# Patient Record
Sex: MALE | Race: WHITE | NOT HISPANIC OR LATINO | Employment: OTHER | URBAN - METROPOLITAN AREA
[De-identification: names, ages, dates, MRNs, and addresses within clinical notes are randomized per-mention and may not be internally consistent; named-entity substitution may affect disease eponyms.]

---

## 2023-08-16 ENCOUNTER — HOSPITAL ENCOUNTER (OUTPATIENT)
Dept: DATA CONVERSION | Facility: HOSPITAL | Age: 73
Discharge: HOME | End: 2023-08-16
Payer: MEDICARE

## 2023-08-16 DIAGNOSIS — I10 ESSENTIAL (PRIMARY) HYPERTENSION: ICD-10-CM

## 2023-08-16 LAB
ANION GAP SERPL CALCULATED.3IONS-SCNC: 14 MMOL/L (ref 0–19)
BUN SERPL-MCNC: 17 MG/DL (ref 8–25)
BUN/CREAT SERPL: 17 RATIO (ref 8–21)
CALCIUM SERPL-MCNC: 9.1 MG/DL (ref 8.5–10.4)
CHLORIDE SERPL-SCNC: 106 MMOL/L (ref 97–107)
CO2 SERPL-SCNC: 24 MMOL/L (ref 24–31)
CREAT SERPL-MCNC: 1 MG/DL (ref 0.4–1.6)
GFR SERPL CREATININE-BSD FRML MDRD: 78 ML/MIN/1.73 M2
GLUCOSE SERPL-MCNC: 101 MG/DL (ref 65–99)
POTASSIUM SERPL-SCNC: 4.4 MMOL/L (ref 3.4–5.1)
SODIUM SERPL-SCNC: 143 MMOL/L (ref 133–145)

## 2023-08-25 PROBLEM — D64.9 ANEMIA: Status: ACTIVE | Noted: 2023-08-25

## 2023-08-25 PROBLEM — M54.14 THORACIC RADICULOPATHY: Status: ACTIVE | Noted: 2023-08-25

## 2023-08-25 PROBLEM — K21.9 GASTROESOPHAGEAL REFLUX DISEASE: Status: ACTIVE | Noted: 2023-08-25

## 2023-08-25 PROBLEM — J31.0 CHRONIC RHINITIS: Status: ACTIVE | Noted: 2023-08-25

## 2023-08-25 PROBLEM — E87.6 HYPOKALEMIA: Status: ACTIVE | Noted: 2023-08-25

## 2023-08-25 PROBLEM — M19.90 DEGENERATIVE ARTHRITIS: Status: ACTIVE | Noted: 2023-08-25

## 2023-08-25 PROBLEM — E78.00 PURE HYPERCHOLESTEROLEMIA: Status: ACTIVE | Noted: 2023-08-25

## 2023-08-25 PROBLEM — M79.603 PAIN IN UPPER LIMB: Status: ACTIVE | Noted: 2023-08-25

## 2023-08-25 PROBLEM — M54.16 LUMBAR RADICULOPATHY: Status: ACTIVE | Noted: 2023-08-25

## 2023-08-25 PROBLEM — K44.9 PARAESOPHAGEAL HERNIA: Status: ACTIVE | Noted: 2023-08-25

## 2023-08-25 PROBLEM — L72.0 EPIDERMAL INCLUSION CYST: Status: ACTIVE | Noted: 2023-08-25

## 2023-08-25 PROBLEM — I10 ESSENTIAL HYPERTENSION: Status: ACTIVE | Noted: 2023-08-25

## 2023-08-25 PROBLEM — R00.2 PALPITATIONS: Status: ACTIVE | Noted: 2023-08-25

## 2023-08-25 PROBLEM — R01.1 HEART MURMUR: Status: ACTIVE | Noted: 2023-08-25

## 2023-08-25 PROBLEM — T65.894A: Status: ACTIVE | Noted: 2023-08-25

## 2023-08-25 PROBLEM — M48.061 LUMBAR SPINAL STENOSIS: Status: ACTIVE | Noted: 2023-08-25

## 2023-08-25 PROBLEM — G57.60 PLANTAR NERVE LESION: Status: ACTIVE | Noted: 2023-08-25

## 2023-08-25 PROBLEM — K59.00 CONSTIPATION: Status: ACTIVE | Noted: 2023-08-25

## 2023-08-25 PROBLEM — Z77.090 HISTORY OF ASBESTOS EXPOSURE: Status: ACTIVE | Noted: 2023-08-25

## 2023-08-25 PROBLEM — R73.01 IMPAIRED FASTING GLUCOSE: Status: ACTIVE | Noted: 2023-08-25

## 2023-08-25 RX ORDER — CHLORTHALIDONE 25 MG/1
25 TABLET ORAL
COMMUNITY
Start: 2021-08-23 | End: 2024-01-11 | Stop reason: WASHOUT

## 2023-08-25 RX ORDER — ATORVASTATIN CALCIUM 10 MG/1
10 TABLET, FILM COATED ORAL NIGHTLY
COMMUNITY
Start: 2018-09-17 | End: 2023-12-13

## 2023-08-25 RX ORDER — IPRATROPIUM BROMIDE 42 UG/1
2 SPRAY, METERED NASAL 3 TIMES DAILY PRN
COMMUNITY

## 2023-08-25 RX ORDER — CALCIUM CARBONATE 200(500)MG
1 TABLET,CHEWABLE ORAL DAILY
COMMUNITY

## 2023-08-25 RX ORDER — TRIAMTERENE/HYDROCHLOROTHIAZID 37.5-25 MG
1 TABLET ORAL DAILY
COMMUNITY
Start: 2022-09-19 | End: 2024-01-11 | Stop reason: WASHOUT

## 2023-08-25 RX ORDER — AMLODIPINE BESYLATE 10 MG/1
10 TABLET ORAL EVERY MORNING
COMMUNITY
Start: 2018-09-17 | End: 2024-03-11

## 2023-08-25 RX ORDER — HYDROCORTISONE 25 MG/G
1 CREAM TOPICAL DAILY PRN
COMMUNITY
Start: 2011-09-16

## 2023-08-25 RX ORDER — POLYETHYLENE GLYCOL 3350 17 G/17G
POWDER, FOR SOLUTION ORAL DAILY PRN
COMMUNITY
Start: 2021-04-28

## 2023-08-25 RX ORDER — TRIAMCINOLONE ACETONIDE 1 MG/G
1 CREAM TOPICAL 2 TIMES WEEKLY
COMMUNITY

## 2023-08-25 RX ORDER — OMEPRAZOLE 40 MG/1
40 CAPSULE, DELAYED RELEASE ORAL DAILY
COMMUNITY
Start: 2015-10-09 | End: 2024-01-11 | Stop reason: WASHOUT

## 2023-08-25 RX ORDER — SIMETHICONE 80 MG
80 TABLET,CHEWABLE ORAL 4 TIMES DAILY PRN
COMMUNITY
End: 2024-01-11 | Stop reason: WASHOUT

## 2023-08-25 RX ORDER — AMLODIPINE BESYLATE 5 MG/1
5 TABLET ORAL EVERY MORNING
COMMUNITY
Start: 2021-07-15 | End: 2024-01-11 | Stop reason: WASHOUT

## 2023-10-18 ENCOUNTER — LAB (OUTPATIENT)
Dept: LAB | Facility: LAB | Age: 73
End: 2023-10-18
Payer: MEDICARE

## 2023-10-18 DIAGNOSIS — Z12.5 ENCOUNTER FOR SCREENING FOR MALIGNANT NEOPLASM OF PROSTATE: ICD-10-CM

## 2023-10-18 DIAGNOSIS — K59.00 CONSTIPATION, UNSPECIFIED: ICD-10-CM

## 2023-10-18 DIAGNOSIS — R73.01 IMPAIRED FASTING GLUCOSE: ICD-10-CM

## 2023-10-18 DIAGNOSIS — I10 ESSENTIAL (PRIMARY) HYPERTENSION: Primary | ICD-10-CM

## 2023-10-18 LAB
ALBUMIN SERPL-MCNC: 4.8 G/DL (ref 3.5–5)
ALP BLD-CCNC: 110 U/L (ref 35–125)
ALT SERPL-CCNC: 22 U/L (ref 5–40)
ANION GAP SERPL CALC-SCNC: 15 MMOL/L
AST SERPL-CCNC: 21 U/L (ref 5–40)
BILIRUB SERPL-MCNC: 0.7 MG/DL (ref 0.1–1.2)
BUN SERPL-MCNC: 20 MG/DL (ref 8–25)
CALCIUM SERPL-MCNC: 9.2 MG/DL (ref 8.5–10.4)
CHLORIDE SERPL-SCNC: 104 MMOL/L (ref 97–107)
CO2 SERPL-SCNC: 25 MMOL/L (ref 24–31)
CREAT SERPL-MCNC: 1 MG/DL (ref 0.4–1.6)
ERYTHROCYTE [DISTWIDTH] IN BLOOD BY AUTOMATED COUNT: 13.2 % (ref 11.5–14.5)
EST. AVERAGE GLUCOSE BLD GHB EST-MCNC: 114 MG/DL
GFR SERPL CREATININE-BSD FRML MDRD: 78 ML/MIN/1.73M*2
GLUCOSE SERPL-MCNC: 98 MG/DL (ref 65–99)
HBA1C MFR BLD: 5.6 %
HCT VFR BLD AUTO: 42.6 % (ref 41–52)
HGB BLD-MCNC: 14.2 G/DL (ref 13.5–17.5)
MCH RBC QN AUTO: 30.8 PG (ref 26–34)
MCHC RBC AUTO-ENTMCNC: 33.3 G/DL (ref 32–36)
MCV RBC AUTO: 92 FL (ref 80–100)
NRBC BLD-RTO: 0 /100 WBCS (ref 0–0)
PLATELET # BLD AUTO: 192 X10*3/UL (ref 150–450)
PMV BLD AUTO: 9.8 FL (ref 7.5–11.5)
POTASSIUM SERPL-SCNC: 4.3 MMOL/L (ref 3.4–5.1)
PROT SERPL-MCNC: 6.6 G/DL (ref 5.9–7.9)
PSA SERPL-MCNC: 0.6 NG/ML
RBC # BLD AUTO: 4.61 X10*6/UL (ref 4.5–5.9)
SODIUM SERPL-SCNC: 144 MMOL/L (ref 133–145)
WBC # BLD AUTO: 7.2 X10*3/UL (ref 4.4–11.3)

## 2023-10-18 PROCEDURE — 83036 HEMOGLOBIN GLYCOSYLATED A1C: CPT

## 2023-10-18 PROCEDURE — 36415 COLL VENOUS BLD VENIPUNCTURE: CPT

## 2023-10-18 PROCEDURE — G0103 PSA SCREENING: HCPCS

## 2023-10-18 PROCEDURE — 80053 COMPREHEN METABOLIC PANEL: CPT

## 2023-10-18 PROCEDURE — 85027 COMPLETE CBC AUTOMATED: CPT

## 2023-10-25 ENCOUNTER — APPOINTMENT (OUTPATIENT)
Dept: PRIMARY CARE | Facility: CLINIC | Age: 73
End: 2023-10-25
Payer: MEDICARE

## 2023-12-13 DIAGNOSIS — E78.00 PURE HYPERCHOLESTEROLEMIA: ICD-10-CM

## 2023-12-13 RX ORDER — ATORVASTATIN CALCIUM 10 MG/1
10 TABLET, FILM COATED ORAL NIGHTLY
Qty: 90 TABLET | Refills: 2 | Status: SHIPPED | OUTPATIENT
Start: 2023-12-13

## 2024-01-11 ENCOUNTER — OFFICE VISIT (OUTPATIENT)
Dept: PRIMARY CARE | Facility: CLINIC | Age: 74
End: 2024-01-11
Payer: MEDICARE

## 2024-01-11 VITALS
BODY MASS INDEX: 28.09 KG/M2 | WEIGHT: 193 LBS | OXYGEN SATURATION: 97 % | SYSTOLIC BLOOD PRESSURE: 150 MMHG | DIASTOLIC BLOOD PRESSURE: 80 MMHG | HEART RATE: 84 BPM

## 2024-01-11 DIAGNOSIS — I10 ESSENTIAL HYPERTENSION: ICD-10-CM

## 2024-01-11 DIAGNOSIS — Z00.00 ROUTINE GENERAL MEDICAL EXAMINATION AT HEALTH CARE FACILITY: Primary | ICD-10-CM

## 2024-01-11 DIAGNOSIS — R73.01 IMPAIRED FASTING GLUCOSE: ICD-10-CM

## 2024-01-11 DIAGNOSIS — Z77.090 HISTORY OF ASBESTOS EXPOSURE: ICD-10-CM

## 2024-01-11 DIAGNOSIS — M15.9 PRIMARY OSTEOARTHRITIS INVOLVING MULTIPLE JOINTS: ICD-10-CM

## 2024-01-11 DIAGNOSIS — M48.061 SPINAL STENOSIS OF LUMBAR REGION WITHOUT NEUROGENIC CLAUDICATION: ICD-10-CM

## 2024-01-11 DIAGNOSIS — Z12.5 SCREENING FOR PROSTATE CANCER: ICD-10-CM

## 2024-01-11 DIAGNOSIS — E78.00 PURE HYPERCHOLESTEROLEMIA: ICD-10-CM

## 2024-01-11 PROBLEM — K44.9 PARAESOPHAGEAL HERNIA: Status: RESOLVED | Noted: 2023-08-25 | Resolved: 2024-01-11

## 2024-01-11 PROCEDURE — 3077F SYST BP >= 140 MM HG: CPT | Performed by: INTERNAL MEDICINE

## 2024-01-11 PROCEDURE — 1159F MED LIST DOCD IN RCRD: CPT | Performed by: INTERNAL MEDICINE

## 2024-01-11 PROCEDURE — 3079F DIAST BP 80-89 MM HG: CPT | Performed by: INTERNAL MEDICINE

## 2024-01-11 PROCEDURE — 99215 OFFICE O/P EST HI 40 MIN: CPT | Performed by: INTERNAL MEDICINE

## 2024-01-11 PROCEDURE — 1126F AMNT PAIN NOTED NONE PRSNT: CPT | Performed by: INTERNAL MEDICINE

## 2024-01-11 PROCEDURE — G0439 PPPS, SUBSEQ VISIT: HCPCS | Performed by: INTERNAL MEDICINE

## 2024-01-11 RX ORDER — LISINOPRIL 10 MG/1
10 TABLET ORAL DAILY
Qty: 90 TABLET | Refills: 2 | Status: SHIPPED | OUTPATIENT
Start: 2024-01-11 | End: 2024-10-07

## 2024-01-11 ASSESSMENT — PAIN SCALES - GENERAL: PAINLEVEL: 0-NO PAIN

## 2024-01-11 ASSESSMENT — ENCOUNTER SYMPTOMS
OCCASIONAL FEELINGS OF UNSTEADINESS: 0
SHORTNESS OF BREATH: 0
LOSS OF SENSATION IN FEET: 0
PALPITATIONS: 0
DEPRESSION: 0

## 2024-01-11 ASSESSMENT — PATIENT HEALTH QUESTIONNAIRE - PHQ9
2. FEELING DOWN, DEPRESSED OR HOPELESS: NOT AT ALL
1. LITTLE INTEREST OR PLEASURE IN DOING THINGS: NOT AT ALL
2. FEELING DOWN, DEPRESSED OR HOPELESS: NOT AT ALL
SUM OF ALL RESPONSES TO PHQ9 QUESTIONS 1 AND 2: 0
SUM OF ALL RESPONSES TO PHQ9 QUESTIONS 1 AND 2: 0
1. LITTLE INTEREST OR PLEASURE IN DOING THINGS: NOT AT ALL

## 2024-01-11 NOTE — PROGRESS NOTES
[facility]: [facility]  MEDICARE WELLNESS EXAM      Jose Baker is a 73 y.o. male that is presenting today for Annual Exam (Pt states on the rt hand he has trigger finger. Rt ring finger, pt wants to know if he can get a chest xray due to where he used to work ).    Assessment/Plan    Diagnoses and all orders for this visit:  Routine general medical examination at health care facility  Essential hypertension  Comments:  Add Lisinopril 10 mg daily with amlodipine 10mg daily. Low salt diet, rare alcohol.  History of asbestos exposure  Comments:  Recheck chest xray 5/24.  Orders:  -     XR chest 2 views; Future  Primary osteoarthritis involving multiple joints  Spinal stenosis of lumbar region without neurogenic claudication  Pure hypercholesterolemia  Comments:  On atorvastatin recheck levels.  Orders:  -     Lipid Panel; Future  -     Comprehensive Metabolic Panel; Future  Impaired fasting glucose  -     CBC; Future  -     Hemoglobin A1C; Future  Screening for prostate cancer  -     Prostate Specific Antigen; Future    ADVANCED CARE PLANNING  Advanced Care Planning was discussed with patient:  The patient does not have an advanced care plan on file. The patient does not have an active surrogate decision-maker on file.  Encouraged the patient to confirm that Living Will and Healthcare Power of  (HCPoA) are accurate and up to date.  Encouraged the patient to confirm that our office be provided a copy of any documentation in the event that anything changes.    ACTIVITIES OF DAILY LIVING  Basic ADLs:  Bathing: Independent, Dressing: Independent, Toileting: Independent, Transferring: Independent, Continence: Independent, Feeding: Independent.    Instrumental ADLs:  Ability to use phone: Independent, Shopping: Independent, Cooking: Independent, House-keeping: Independent, Laundry: Independent, Transportation: Independent, Medication Management: Independent, Finance Management:  Independent.    Subjective   Wellness visit. Over all health status doing well. Diet reviewed, Mediterranean, low sugar diet suggested. No  active depression, or little interest in doing activites or hopelessness. Home safety reviewed, well light, no throw rugs,etc. No falls. Advanced directives filled out at home.  ADL, and instrumental ADL no limits doing well. Vision screen eye exam yearly, hearing screen 6 ft whisper test normal.  No cognitive decline observed. Screening sheet given. Back OK, bring in [employer], Living will papers on follow up.      Review of Systems   Respiratory:  Negative for shortness of breath.    Cardiovascular:  Negative for chest pain and palpitations.   All other systems reviewed and are negative.    Objective   Vitals:    01/11/24 1354   BP: 150/80   Pulse: 84   SpO2: 97%      Body mass index is 28.09 kg/m².  Physical Exam  Constitutional:       General: He is not in acute distress.     Appearance: He is not toxic-appearing.   HENT:      Head: Normocephalic and atraumatic.      Right Ear: Tympanic membrane and ear canal normal.      Left Ear: Tympanic membrane and ear canal normal.      Nose: Nose normal.      Mouth/Throat:      Pharynx: Oropharynx is clear.   Eyes:      Extraocular Movements: Extraocular movements intact.      Pupils: Pupils are equal, round, and reactive to light.   Cardiovascular:      Rate and Rhythm: Normal rate and regular rhythm.      Heart sounds: Murmur (2/6 ARUN) heard.   Pulmonary:      Breath sounds: Normal breath sounds.   Abdominal:      General: Bowel sounds are normal. There is no distension.      Palpations: Abdomen is soft. There is no mass.      Tenderness: There is no abdominal tenderness.   Musculoskeletal:         General: No swelling or tenderness.      Right lower leg: Edema (trace) present.      Left lower leg: Left lower leg edema: trace.   Skin:     General: Skin is warm and dry.   Neurological:      General: No focal deficit present.       "Mental Status: He is oriented to person, place, and time.      Sensory: No sensory deficit.      Motor: No weakness.      Deep Tendon Reflexes: Reflexes normal.       Diagnostic Results   Lab Results   Component Value Date    GLUCOSE 98 10/18/2023    CALCIUM 9.2 10/18/2023     10/18/2023    K 4.3 10/18/2023    CO2 25 10/18/2023     10/18/2023    BUN 20 10/18/2023    CREATININE 1.00 10/18/2023     Lab Results   Component Value Date    ALT 22 10/18/2023    AST 21 10/18/2023    ALKPHOS 110 10/18/2023    BILITOT 0.7 10/18/2023     Lab Results   Component Value Date    WBC 7.2 10/18/2023    HGB 14.2 10/18/2023    HCT 42.6 10/18/2023    MCV 92 10/18/2023     10/18/2023     Lab Results   Component Value Date    CHOL 140 03/08/2023    CHOL 189 04/19/2022    CHOL 148 04/22/2021     Lab Results   Component Value Date    HDL 75 03/08/2023    HDL 60 04/19/2022    HDL 53 04/22/2021     Lab Results   Component Value Date    LDLCALC 45 (L) 03/08/2023    LDLCALC 96 04/19/2022    LDLCALC 75 04/22/2021     Lab Results   Component Value Date    TRIG 99 03/08/2023    TRIG 163 (H) 04/19/2022    TRIG 98 04/22/2021     No components found for: \"CHOLHDL\"  Lab Results   Component Value Date    HGBA1C 5.6 10/18/2023     Other labs not included in the list above reviewed either before or during this encounter.    History   Past Medical History:   Diagnosis Date    Benign enlargement of prostate     10/22 PSA 0.8    Cervical radiculopathy     Chronic rhinitis     nasal steroids no help, seen ENT, atrovent nasal spray    Degenerative arthritis 08/25/2023    ED (erectile dysfunction)     Heart murmur 08/25/2023    Echo Dr. Perkins echo 10/22 EF 60% valves OK.    History of asbestos exposure     [facility]. 3/23 CXR neg.    History of SCC (squamous cell carcinoma) of skin     [facility] skin check yrly m10/22    HTN (hypertension)     ECG NSR 8/22    Impaired fasting blood sugar     Lumbar radiculopathy      " injections1=/- response, declines surgery.    Lumbar spinal stenosis 2023    Ocular migraine     carotids 50-69% B, ESR,  follow up     Paraesophageal hernia 2023    Personal history of malignant neoplasm, unspecified     History of malignant neoplasm    Personal history of other diseases of the circulatory system     History of hypertension    Pure hypercholesterolemia 2023     Past Surgical History:   Procedure Laterality Date    CYST REMOVAL      left ear sebaceous cyst removal .    OTHER SURGICAL HISTORY  2022    Skin lesion excision    PARAESOPHAGEAL HERNIA REPAIR Bilateral     Dr.Aviv Lobo  with mesh,crural repair,partial fundicoplication.     Family History   Problem Relation Name Age of Onset    Diabetes Father      Deep vein thrombosis Brother      Deep vein thrombosis Paternal Grandfather       Social History     Socioeconomic History    Marital status:      Spouse name: Not on file    Number of children: Not on file    Years of education: Not on file    Highest education level: Not on file   Occupational History    Not on file   Tobacco Use    Smoking status: Former     Types: Cigarettes     Quit date:      Years since quittin.0    Smokeless tobacco: Never   Vaping Use    Vaping Use: Never used   Substance and Sexual Activity    Alcohol use: Yes     Alcohol/week: 10.0 standard drinks of alcohol     Types: 10 Standard drinks or equivalent per week    Drug use: Never    Sexual activity: Not on file   Other Topics Concern    Not on file   Social History Narrative    Not on file     Social Determinants of Health     Financial Resource Strain: Not on file   Food Insecurity: Not on file   Transportation Needs: Not on file   Physical Activity: Not on file   Stress: Not on file   Social Connections: Not on file   Intimate Partner Violence: Not on file   Housing Stability: Not on file     Allergies   Allergen Reactions    Ipratropium Bromide Other      Dryness/nasal    Losartan Nausea Only and Other     Indigestion/ upset gi     Current Outpatient Medications on File Prior to Visit   Medication Sig Dispense Refill    amLODIPine (Norvasc) 10 mg tablet Take 1 tablet (10 mg) by mouth once daily in the morning.      atorvastatin (Lipitor) 10 mg tablet TAKE ONE TABLET BY MOUTH DAILY AT BEDTIME 90 tablet 2    calcium carbonate (Tums) 200 mg calcium chewable tablet Chew 1 tablet (500 mg) once daily.  for 30 day(s)      hydrocortisone 2.5 % cream Apply 1 Application topically once daily as needed (to affected area).      ipratropium (Atrovent) 42 mcg (0.06 %) nasal spray Administer 2 sprays into each nostril 3 times a day as needed (nasal congestion for 30 days).      multivit-minerals/folic acid (ADULT MULTIVITAMIN GUMMIES ORAL) Multivitamin Gummies Adults      polyethylene glycol (Miralax) 17 gram/dose powder Take by mouth once daily as needed (1-2 capfuls).      triamcinolone (Kenalog) 0.1 % cream Apply 1 Application topically 2 times a week.      [DISCONTINUED] amLODIPine (Norvasc) 5 mg tablet Take 1 tablet (5 mg) by mouth once daily in the morning.      [DISCONTINUED] chlorthalidone (Hygroton) 25 mg tablet Take 1 tablet (25 mg) by mouth once daily with breakfast.      [DISCONTINUED] omeprazole (PriLOSEC) 40 mg DR capsule Take 1 capsule (40 mg) by mouth once daily.      [DISCONTINUED] pedi multivit 43-iron fumarate (Flintstones Complete, iron,) 18 mg iron tablet,chewable Chew 1 tablet once daily. for 30 day(s)      [DISCONTINUED] simethicone (Mylicon) 80 mg chewable tablet Chew 1 tablet (80 mg) 4 times a day as needed (After meals and at bedtime.).      [DISCONTINUED] triamterene-hydrochlorothiazid (Maxzide-25) 37.5-25 mg tablet Take 1 tablet by mouth once daily. for 90 days       No current facility-administered medications on file prior to visit.     Immunization History   Administered Date(s) Administered    DTaP vaccine, pediatric  (INFANRIX) 03/16/2012    Flu  vaccine, quadrivalent, high-dose, preservative free, age 65y+ (FLUZONE) 10/21/2020, 10/28/2021, 10/06/2022    Hepatitis B vaccine, adult (RECOMBIVAX, ENGERIX) 06/14/2013, 07/12/2013, 12/13/2013    Influenza, High Dose Seasonal, Preservative Free 10/14/2016, 10/27/2017, 11/02/2018, 11/14/2019    Influenza, injectable, quadrivalent 10/09/2015    Influenza, seasonal, injectable 10/31/2012, 10/15/2013    Moderna SARS-CoV-2 Vaccination 03/15/2021, 04/15/2021, 12/15/2021    Pneumococcal conjugate vaccine, 13-valent (PREVNAR 13) 10/14/2016    Pneumococcal polysaccharide vaccine, 23-valent, age 2 years and older (PNEUMOVAX 23) 03/15/2013, 05/04/2018    Td (adult), unspecified 03/12/2004, 03/01/2012    Zoster vaccine, recombinant, adult (SHINGRIX) 10/06/2022, 01/11/2023    Zoster, live 01/01/2009     Patient's medical history was reviewed and updated either before or during this encounter.     Sergio Dumont MD

## 2024-01-11 NOTE — PATIENT INSTRUCTIONS
Get labs, Chest xray 3/24 as ordered.      Diagnoses and all orders for this visit:  Routine general medical examination at health care facility  Essential hypertension  Comments:  Add Lisinopril 10 mg daily with amlodipine 10mg daily. Low salt diet, rare alcohol.  Orders:  -     lisinopril 10 mg tablet; Take 1 tablet (10 mg) by mouth once daily.  History of asbestos exposure  Comments:  Recheck chest xray 5/24.  Orders:  -     XR chest 2 views; Future  Primary osteoarthritis involving multiple joints  Spinal stenosis of lumbar region without neurogenic claudication  Pure hypercholesterolemia  Comments:  On atorvastatin recheck levels.  Orders:  -     Lipid Panel; Future  -     Comprehensive Metabolic Panel; Future  Impaired fasting glucose  -     CBC; Future  -     Hemoglobin A1C; Future  Screening for prostate cancer  -     Prostate Specific Antigen; Future

## 2024-03-09 DIAGNOSIS — I10 ESSENTIAL HYPERTENSION: ICD-10-CM

## 2024-03-11 ENCOUNTER — HOSPITAL ENCOUNTER (OUTPATIENT)
Dept: RADIOLOGY | Facility: CLINIC | Age: 74
Discharge: HOME | End: 2024-03-11
Payer: MEDICARE

## 2024-03-11 ENCOUNTER — LAB (OUTPATIENT)
Dept: LAB | Facility: LAB | Age: 74
End: 2024-03-11
Payer: MEDICARE

## 2024-03-11 DIAGNOSIS — R73.01 IMPAIRED FASTING GLUCOSE: ICD-10-CM

## 2024-03-11 DIAGNOSIS — Z12.5 SCREENING FOR PROSTATE CANCER: ICD-10-CM

## 2024-03-11 DIAGNOSIS — Z77.090 HISTORY OF ASBESTOS EXPOSURE: ICD-10-CM

## 2024-03-11 DIAGNOSIS — E78.00 PURE HYPERCHOLESTEROLEMIA: ICD-10-CM

## 2024-03-11 LAB
ALBUMIN SERPL-MCNC: 4.8 G/DL (ref 3.5–5)
ALP BLD-CCNC: 96 U/L (ref 35–125)
ALT SERPL-CCNC: 18 U/L (ref 5–40)
ANION GAP SERPL CALC-SCNC: 15 MMOL/L
AST SERPL-CCNC: 16 U/L (ref 5–40)
BILIRUB SERPL-MCNC: 0.7 MG/DL (ref 0.1–1.2)
BUN SERPL-MCNC: 24 MG/DL (ref 8–25)
CALCIUM SERPL-MCNC: 9.6 MG/DL (ref 8.5–10.4)
CHLORIDE SERPL-SCNC: 101 MMOL/L (ref 97–107)
CHOLEST SERPL-MCNC: 155 MG/DL (ref 133–200)
CHOLEST/HDLC SERPL: 2 {RATIO}
CO2 SERPL-SCNC: 24 MMOL/L (ref 24–31)
CREAT SERPL-MCNC: 1.2 MG/DL (ref 0.4–1.6)
EGFRCR SERPLBLD CKD-EPI 2021: 63 ML/MIN/1.73M*2
ERYTHROCYTE [DISTWIDTH] IN BLOOD BY AUTOMATED COUNT: 12.7 % (ref 11.5–14.5)
EST. AVERAGE GLUCOSE BLD GHB EST-MCNC: 111 MG/DL
GLUCOSE SERPL-MCNC: 99 MG/DL (ref 65–99)
HBA1C MFR BLD: 5.5 %
HCT VFR BLD AUTO: 43.7 % (ref 41–52)
HDLC SERPL-MCNC: 76 MG/DL
HGB BLD-MCNC: 14.5 G/DL (ref 13.5–17.5)
LDLC SERPL CALC-MCNC: 61 MG/DL (ref 65–130)
MCH RBC QN AUTO: 30.9 PG (ref 26–34)
MCHC RBC AUTO-ENTMCNC: 33.2 G/DL (ref 32–36)
MCV RBC AUTO: 93 FL (ref 80–100)
NRBC BLD-RTO: 0 /100 WBCS (ref 0–0)
PLATELET # BLD AUTO: 202 X10*3/UL (ref 150–450)
POTASSIUM SERPL-SCNC: 4.7 MMOL/L (ref 3.4–5.1)
PROT SERPL-MCNC: 6.9 G/DL (ref 5.9–7.9)
PSA SERPL-MCNC: 0.6 NG/ML
RBC # BLD AUTO: 4.69 X10*6/UL (ref 4.5–5.9)
SODIUM SERPL-SCNC: 140 MMOL/L (ref 133–145)
TRIGL SERPL-MCNC: 88 MG/DL (ref 40–150)
WBC # BLD AUTO: 8.8 X10*3/UL (ref 4.4–11.3)

## 2024-03-11 PROCEDURE — 85027 COMPLETE CBC AUTOMATED: CPT

## 2024-03-11 PROCEDURE — 80061 LIPID PANEL: CPT

## 2024-03-11 PROCEDURE — 36415 COLL VENOUS BLD VENIPUNCTURE: CPT

## 2024-03-11 PROCEDURE — 83036 HEMOGLOBIN GLYCOSYLATED A1C: CPT

## 2024-03-11 PROCEDURE — G0103 PSA SCREENING: HCPCS

## 2024-03-11 PROCEDURE — 71046 X-RAY EXAM CHEST 2 VIEWS: CPT | Performed by: RADIOLOGY

## 2024-03-11 PROCEDURE — 80053 COMPREHEN METABOLIC PANEL: CPT

## 2024-03-11 PROCEDURE — 71046 X-RAY EXAM CHEST 2 VIEWS: CPT

## 2024-03-11 RX ORDER — AMLODIPINE BESYLATE 10 MG/1
10 TABLET ORAL
Qty: 90 TABLET | Refills: 2 | Status: SHIPPED | OUTPATIENT
Start: 2024-03-11

## 2024-04-22 ENCOUNTER — HOSPITAL ENCOUNTER (OUTPATIENT)
Dept: RADIOLOGY | Facility: CLINIC | Age: 74
Discharge: HOME | End: 2024-04-22
Payer: MEDICARE

## 2024-04-22 DIAGNOSIS — M48.061 SPINAL STENOSIS, LUMBAR REGION WITHOUT NEUROGENIC CLAUDICATION: ICD-10-CM

## 2024-04-22 PROCEDURE — 72148 MRI LUMBAR SPINE W/O DYE: CPT

## 2024-04-22 PROCEDURE — 72148 MRI LUMBAR SPINE W/O DYE: CPT | Performed by: RADIOLOGY

## 2024-07-08 PROBLEM — R21 RASH AND OTHER NONSPECIFIC SKIN ERUPTION: Status: RESOLVED | Noted: 2024-07-08 | Resolved: 2024-07-08

## 2024-07-08 PROBLEM — M17.12 PRIMARY OSTEOARTHRITIS OF LEFT KNEE: Status: RESOLVED | Noted: 2017-01-20 | Resolved: 2024-07-08

## 2024-07-08 PROBLEM — S29.012A STRAIN OF THORACIC BACK REGION: Status: RESOLVED | Noted: 2018-10-05 | Resolved: 2024-07-08

## 2024-07-08 PROBLEM — M47.816 LUMBAR SPONDYLOSIS: Status: RESOLVED | Noted: 2024-07-08 | Resolved: 2024-07-08

## 2024-07-08 PROBLEM — E87.6 HYPOKALEMIA: Status: RESOLVED | Noted: 2023-05-28 | Resolved: 2024-07-08

## 2024-07-08 PROBLEM — H25.813 COMBINED FORMS OF AGE-RELATED CATARACT OF BOTH EYES: Status: RESOLVED | Noted: 2024-07-08 | Resolved: 2024-07-08

## 2024-07-08 PROBLEM — R26.9 ABNORMALITY OF GAIT: Status: RESOLVED | Noted: 2017-08-09 | Resolved: 2024-07-08

## 2024-07-08 PROBLEM — H52.7 DISORDER OF REFRACTION: Status: RESOLVED | Noted: 2024-07-08 | Resolved: 2024-07-08

## 2024-07-08 PROBLEM — Z86.79 HISTORY OF HYPERTENSION: Status: RESOLVED | Noted: 2024-07-08 | Resolved: 2024-07-08

## 2024-07-08 PROBLEM — G89.29 CHRONIC PAIN OF LEFT KNEE: Status: RESOLVED | Noted: 2017-08-09 | Resolved: 2024-07-08

## 2024-07-08 PROBLEM — Z20.822 CONTACT WITH AND (SUSPECTED) EXPOSURE TO COVID-19: Status: RESOLVED | Noted: 2022-08-22 | Resolved: 2024-07-08

## 2024-07-08 PROBLEM — D64.9 ANEMIA: Status: RESOLVED | Noted: 2022-08-22 | Resolved: 2024-07-08

## 2024-07-08 PROBLEM — L72.0 EPIDERMOID CYST: Status: RESOLVED | Noted: 2024-07-08 | Resolved: 2024-07-08

## 2024-07-08 PROBLEM — G43.109 MIGRAINE WITH AURA: Status: RESOLVED | Noted: 2024-07-08 | Resolved: 2024-07-08

## 2024-07-08 PROBLEM — M48.061 SPINAL STENOSIS OF LUMBAR REGION: Status: RESOLVED | Noted: 2024-07-08 | Resolved: 2024-07-08

## 2024-07-08 PROBLEM — Z85.9 HISTORY OF MALIGNANT NEOPLASM: Status: RESOLVED | Noted: 2024-07-08 | Resolved: 2024-07-08

## 2024-07-08 PROBLEM — M19.90 OSTEOARTHRITIS: Status: RESOLVED | Noted: 2023-05-28 | Resolved: 2024-07-08

## 2024-07-08 PROBLEM — M25.562 CHRONIC PAIN OF LEFT KNEE: Status: RESOLVED | Noted: 2017-08-09 | Resolved: 2024-07-08

## 2024-07-08 PROBLEM — Z11.52 ENCOUNTER FOR SCREENING FOR COVID-19: Status: RESOLVED | Noted: 2022-08-19 | Resolved: 2024-07-08

## 2024-07-08 PROBLEM — M17.10 ARTHRITIS OF KNEE: Status: RESOLVED | Noted: 2017-01-20 | Resolved: 2024-07-08

## 2024-07-11 ENCOUNTER — OFFICE VISIT (OUTPATIENT)
Dept: PRIMARY CARE | Facility: CLINIC | Age: 74
End: 2024-07-11
Payer: MEDICARE

## 2024-07-11 VITALS
HEART RATE: 89 BPM | DIASTOLIC BLOOD PRESSURE: 84 MMHG | HEIGHT: 70 IN | TEMPERATURE: 98.1 F | OXYGEN SATURATION: 95 % | BODY MASS INDEX: 27.2 KG/M2 | SYSTOLIC BLOOD PRESSURE: 126 MMHG | WEIGHT: 190 LBS

## 2024-07-11 DIAGNOSIS — I10 ESSENTIAL HYPERTENSION: Primary | ICD-10-CM

## 2024-07-11 DIAGNOSIS — I10 ESSENTIAL HYPERTENSION: ICD-10-CM

## 2024-07-11 DIAGNOSIS — K21.9 GASTROESOPHAGEAL REFLUX DISEASE WITHOUT ESOPHAGITIS: ICD-10-CM

## 2024-07-11 DIAGNOSIS — E78.00 PURE HYPERCHOLESTEROLEMIA: ICD-10-CM

## 2024-07-11 DIAGNOSIS — N40.0 BENIGN PROSTATIC HYPERPLASIA WITHOUT LOWER URINARY TRACT SYMPTOMS: ICD-10-CM

## 2024-07-11 DIAGNOSIS — R73.01 IMPAIRED FASTING GLUCOSE: ICD-10-CM

## 2024-07-11 DIAGNOSIS — J31.0 CHRONIC RHINITIS: ICD-10-CM

## 2024-07-11 DIAGNOSIS — J30.0 VASOMOTOR RHINITIS: ICD-10-CM

## 2024-07-11 PROCEDURE — 1159F MED LIST DOCD IN RCRD: CPT | Performed by: INTERNAL MEDICINE

## 2024-07-11 PROCEDURE — 99214 OFFICE O/P EST MOD 30 MIN: CPT | Performed by: INTERNAL MEDICINE

## 2024-07-11 PROCEDURE — G2211 COMPLEX E/M VISIT ADD ON: HCPCS | Performed by: INTERNAL MEDICINE

## 2024-07-11 PROCEDURE — 1125F AMNT PAIN NOTED PAIN PRSNT: CPT | Performed by: INTERNAL MEDICINE

## 2024-07-11 PROCEDURE — 3079F DIAST BP 80-89 MM HG: CPT | Performed by: INTERNAL MEDICINE

## 2024-07-11 PROCEDURE — 1036F TOBACCO NON-USER: CPT | Performed by: INTERNAL MEDICINE

## 2024-07-11 PROCEDURE — 3074F SYST BP LT 130 MM HG: CPT | Performed by: INTERNAL MEDICINE

## 2024-07-11 RX ORDER — IPRATROPIUM BROMIDE 42 UG/1
2 SPRAY, METERED NASAL 3 TIMES DAILY PRN
Qty: 15 ML | Refills: 11 | Status: SHIPPED | OUTPATIENT
Start: 2024-07-11

## 2024-07-11 RX ORDER — LISINOPRIL 10 MG/1
10 TABLET ORAL DAILY
Qty: 90 TABLET | Refills: 2 | Status: SHIPPED | OUTPATIENT
Start: 2024-07-11 | End: 2025-04-07

## 2024-07-11 RX ORDER — ATORVASTATIN CALCIUM 10 MG/1
10 TABLET, FILM COATED ORAL NIGHTLY
Qty: 90 TABLET | Refills: 2 | Status: SHIPPED | OUTPATIENT
Start: 2024-07-11

## 2024-07-11 RX ORDER — AMLODIPINE BESYLATE 10 MG/1
10 TABLET ORAL
Qty: 90 TABLET | Refills: 2 | Status: SHIPPED | OUTPATIENT
Start: 2024-07-11

## 2024-07-11 ASSESSMENT — ENCOUNTER SYMPTOMS
DEPRESSION: 0
SHORTNESS OF BREATH: 0
OCCASIONAL FEELINGS OF UNSTEADINESS: 0
PALPITATIONS: 0
LOSS OF SENSATION IN FEET: 0

## 2024-07-11 ASSESSMENT — PATIENT HEALTH QUESTIONNAIRE - PHQ9
2. FEELING DOWN, DEPRESSED OR HOPELESS: NOT AT ALL
1. LITTLE INTEREST OR PLEASURE IN DOING THINGS: NOT AT ALL
SUM OF ALL RESPONSES TO PHQ9 QUESTIONS 1 AND 2: 0

## 2024-07-11 ASSESSMENT — PAIN SCALES - GENERAL: PAINLEVEL: 6

## 2024-07-11 NOTE — PATIENT INSTRUCTIONS
January follow up. Labs 1-2 weeks before apt. RSV, flu vaccine at pharmacies 9-10/24.    Diagnoses and all orders for this visit:  Essential hypertension  Comments:  BP doing OK.  Orders:  -     lisinopril 10 mg tablet; Take 1 tablet (10 mg) by mouth once daily.  -     amLODIPine (Norvasc) 10 mg tablet; Take 1 tablet (10 mg) by mouth once daily in the morning. Take before meals.  Impaired fasting glucose  -     CBC and Auto Differential; Future  -     Hemoglobin A1C; Future  Chronic rhinitis  Gastroesophageal reflux disease without esophagitis  Pure hypercholesterolemia  Comments:  LDL 61 on atorvastatin 3/24.  Orders:  -     atorvastatin (Lipitor) 10 mg tablet; Take 1 tablet (10 mg) by mouth once daily at bedtime.  -     Comprehensive Metabolic Panel; Future  -     Lipid Panel; Future  Essential hypertension  Comments:  Add Lisinopril 10 mg daily with amlodipine 10mg daily. Low salt diet, rare alcohol.  Orders:  -     lisinopril 10 mg tablet; Take 1 tablet (10 mg) by mouth once daily.  -     amLODIPine (Norvasc) 10 mg tablet; Take 1 tablet (10 mg) by mouth once daily in the morning. Take before meals.  Pure hypercholesterolemia  -     atorvastatin (Lipitor) 10 mg tablet; Take 1 tablet (10 mg) by mouth once daily at bedtime.  -     Comprehensive Metabolic Panel; Future  -     Lipid Panel; Future  Essential hypertension  -     lisinopril 10 mg tablet; Take 1 tablet (10 mg) by mouth once daily.  -     amLODIPine (Norvasc) 10 mg tablet; Take 1 tablet (10 mg) by mouth once daily in the morning. Take before meals.  Vasomotor rhinitis  -     ipratropium (Atrovent) 42 mcg (0.06 %) nasal spray; Administer 2 sprays into each nostril 3 times a day as needed (nasal congestion for 30 days).  Benign prostatic hyperplasia without lower urinary tract symptoms  -     PSA; Future  Other orders  -     Follow Up In Primary Care - Established

## 2024-07-11 NOTE — PROGRESS NOTES
[facility]: [facility]  PROGRESS NOTE      Jose Baker is a 73 y.o. male that is presenting today for Follow-up.    Assessment/Plan   Diagnoses and all orders for this visit:  Essential hypertension  Comments:  BP doing OK.  Orders:  -     lisinopril 10 mg tablet; Take 1 tablet (10 mg) by mouth once daily.  -     amLODIPine (Norvasc) 10 mg tablet; Take 1 tablet (10 mg) by mouth once daily in the morning. Take before meals.  Impaired fasting glucose  -     CBC and Auto Differential; Future  -     Hemoglobin A1C; Future  Chronic rhinitis  Gastroesophageal reflux disease without esophagitis  Pure hypercholesterolemia  Comments:  LDL 61 on atorvastatin 3/24.  Orders:  -     atorvastatin (Lipitor) 10 mg tablet; Take 1 tablet (10 mg) by mouth once daily at bedtime.  -     Comprehensive Metabolic Panel; Future  -     Lipid Panel; Future  Essential hypertension  Comments:  Add Lisinopril 10 mg daily with amlodipine 10mg daily. Low salt diet, rare alcohol.  Orders:  -     lisinopril 10 mg tablet; Take 1 tablet (10 mg) by mouth once daily.  -     amLODIPine (Norvasc) 10 mg tablet; Take 1 tablet (10 mg) by mouth once daily in the morning. Take before meals.  Pure hypercholesterolemia  -     atorvastatin (Lipitor) 10 mg tablet; Take 1 tablet (10 mg) by mouth once daily at bedtime.  -     Comprehensive Metabolic Panel; Future  -     Lipid Panel; Future  Essential hypertension  -     lisinopril 10 mg tablet; Take 1 tablet (10 mg) by mouth once daily.  -     amLODIPine (Norvasc) 10 mg tablet; Take 1 tablet (10 mg) by mouth once daily in the morning. Take before meals.  Vasomotor rhinitis  -     ipratropium (Atrovent) 42 mcg (0.06 %) nasal spray; Administer 2 sprays into each nostril 3 times a day as needed (nasal congestion for 30 days).  Benign prostatic hyperplasia without lower urinary tract symptoms  -     PSA; Future  Other orders  -     Follow Up In Primary Care - Established    Subjective   SS/LR  , PSA 0.60 3/24 ? Distended bladder no worries.  HTN, chol, IFBS. Doing OK.    Review of Systems   Respiratory:  Negative for shortness of breath.    Cardiovascular:  Negative for chest pain and palpitations.   All other systems reviewed and are negative.     Objective   Vitals:    07/11/24 1440   BP: 126/84   Pulse: 89   Temp: 36.7 °C (98.1 °F)   SpO2: 95%      Body mass index is 27.66 kg/m².  Physical Exam  Constitutional:       General: He is not in acute distress.     Appearance: He is obese.   HENT:      Head: Normocephalic and atraumatic.      Right Ear: Tympanic membrane normal.      Left Ear: Tympanic membrane normal.      Mouth/Throat:      Mouth: Mucous membranes are moist.      Pharynx: Oropharynx is clear.   Eyes:      Extraocular Movements: Extraocular movements intact.      Conjunctiva/sclera: Conjunctivae normal.      Pupils: Pupils are equal, round, and reactive to light.   Cardiovascular:      Rate and Rhythm: Normal rate and regular rhythm.      Heart sounds: Murmur (1/6 ARUN) heard.   Pulmonary:      Breath sounds: Normal breath sounds.   Abdominal:      General: Bowel sounds are normal.      Palpations: Abdomen is soft. There is no mass.   Musculoskeletal:         General: Normal range of motion.      Cervical back: Neck supple. No tenderness.   Skin:     General: Skin is warm and dry.   Neurological:      General: No focal deficit present.      Mental Status: He is oriented to person, place, and time.     Diagnostic Results   Lab Results   Component Value Date    GLUCOSE 99 03/11/2024    CALCIUM 9.6 03/11/2024     03/11/2024    K 4.7 03/11/2024    CO2 24 03/11/2024     03/11/2024    BUN 24 03/11/2024    CREATININE 1.20 03/11/2024     Lab Results   Component Value Date    ALT 18 03/11/2024    AST 16 03/11/2024    ALKPHOS 96 03/11/2024    BILITOT 0.7 03/11/2024     Lab Results   Component Value Date    WBC 8.8 03/11/2024    HGB 14.5 03/11/2024    HCT 43.7 03/11/2024    MCV 93  "03/11/2024     03/11/2024     Lab Results   Component Value Date    CHOL 155 03/11/2024    CHOL 140 03/08/2023    CHOL 189 04/19/2022     Lab Results   Component Value Date    HDL 76.0 03/11/2024    HDL 75 03/08/2023    HDL 60 04/19/2022     Lab Results   Component Value Date    LDLCALC 61 (L) 03/11/2024    LDLCALC 45 (L) 03/08/2023    LDLCALC 96 04/19/2022     Lab Results   Component Value Date    TRIG 88 03/11/2024    TRIG 99 03/08/2023    TRIG 163 (H) 04/19/2022     No components found for: \"CHOLHDL\"  Lab Results   Component Value Date    HGBA1C 5.5 03/11/2024     Other labs not included in the list above were reviewed either before or during this encounter.    History    Past Medical History:   Diagnosis Date   • Abnormality of gait 08/09/2017   • Anemia 08/22/2022   • Arthritis of knee 01/20/2017   • Benign enlargement of prostate     10/22 PSA 0.8, 3/24 0.60   • Cervical radiculopathy    • Chronic pain of left knee 08/09/2017   • Chronic rhinitis     nasal steroids no help, seen ENT, atrovent nasal spray   • Combined forms of age-related cataract of both eyes 07/08/2024   • Contact with and (suspected) exposure to covid-19 08/22/2022   • Degenerative arthritis 08/25/2023   • Disorder of refraction 07/08/2024   • ED (erectile dysfunction)    • Encounter for screening for COVID-19 08/19/2022   • Epidermoid cyst 07/08/2024   • Heart murmur 08/25/2023    Echo Dr. Perkins echo 10/22 EF 60% valves OK.   • History of asbestos exposure     [facility]. 3/23 CXR neg.   • History of hypertension 07/08/2024   • History of malignant neoplasm 07/08/2024   • History of SCC (squamous cell carcinoma) of skin     [facility] skin check yrly m10/22   • HTN (hypertension)     ECG NSR 8/22   • Hypokalemia 05/28/2023   • Impaired fasting blood sugar    • Lumbar radiculopathy      injections1=/- response, declines surgery.   • Lumbar spinal stenosis 08/25/2023   • Lumbar spondylosis 07/08/2024   • Migraine with aura " 2024   • Ocular migraine     carotids 50-69% B, ESR,  follow up    • Osteoarthritis 2023   • Paraesophageal hernia 2023   • Personal history of malignant neoplasm, unspecified     History of malignant neoplasm   • Personal history of other diseases of the circulatory system     History of hypertension   • Primary osteoarthritis of left knee 2017   • Pure hypercholesterolemia 2023   • Rash and other nonspecific skin eruption 2024   • Spinal stenosis of lumbar region 2024   • Strain of thoracic back region 10/05/2018     Past Surgical History:   Procedure Laterality Date   • CYST REMOVAL      left ear sebaceous cyst removal .   • OTHER SURGICAL HISTORY  2022    Skin lesion excision   • PARAESOPHAGEAL HERNIA REPAIR Bilateral     Dr.Aviv Lobo  with mesh,crural repair,partial fundicoplication.     Family History   Problem Relation Name Age of Onset   • Diabetes Father     • Deep vein thrombosis Brother     • Deep vein thrombosis Paternal Grandfather       Social History     Socioeconomic History   • Marital status:      Spouse name: Not on file   • Number of children: Not on file   • Years of education: Not on file   • Highest education level: Not on file   Occupational History   • Not on file   Tobacco Use   • Smoking status: Former     Current packs/day: 0.00     Types: Cigarettes     Quit date:      Years since quittin.5   • Smokeless tobacco: Never   Vaping Use   • Vaping status: Never Used   Substance and Sexual Activity   • Alcohol use: Yes     Alcohol/week: 10.0 standard drinks of alcohol     Types: 10 Standard drinks or equivalent per week   • Drug use: Never   • Sexual activity: Not on file   Other Topics Concern   • Not on file   Social History Narrative   • Not on file     Social Determinants of Health     Financial Resource Strain: Not on file   Food Insecurity: Not on file   Transportation Needs: Not on file    Physical Activity: Not on file   Stress: Not on file   Social Connections: Not on file   Intimate Partner Violence: Not on file   Housing Stability: Not on file     Allergies   Allergen Reactions   • Ipratropium Bromide Other     Dryness/nasal   • Losartan Nausea Only and Other     Indigestion/ upset gi     Current Outpatient Medications on File Prior to Visit   Medication Sig Dispense Refill   • calcium carbonate (Tums) 200 mg calcium chewable tablet Chew 1 tablet (500 mg) once daily.  for 30 day(s)     • hydrocortisone 2.5 % cream Apply 1 Application topically once daily as needed (to affected area).     • multivit-minerals/folic acid (ADULT MULTIVITAMIN GUMMIES ORAL) Multivitamin Gummies Adults     • polyethylene glycol (Miralax) 17 gram/dose powder Take by mouth once daily as needed (1-2 capfuls).     • [DISCONTINUED] amLODIPine (Norvasc) 10 mg tablet TAKE ONE TABLET BY MOUTH EVERY MORNING 90 tablet 2   • [DISCONTINUED] atorvastatin (Lipitor) 10 mg tablet TAKE ONE TABLET BY MOUTH DAILY AT BEDTIME 90 tablet 2   • [DISCONTINUED] ipratropium (Atrovent) 42 mcg (0.06 %) nasal spray Administer 2 sprays into each nostril 3 times a day as needed (nasal congestion for 30 days).     • [DISCONTINUED] lisinopril 10 mg tablet Take 1 tablet (10 mg) by mouth once daily. 90 tablet 2   • [DISCONTINUED] triamcinolone (Kenalog) 0.1 % cream Apply 1 Application topically 2 times a week.       No current facility-administered medications on file prior to visit.     Immunization History   Administered Date(s) Administered   • DTaP vaccine, pediatric  (INFANRIX) 03/16/2012   • Flu vaccine, quadrivalent, high-dose, preservative free, age 65y+ (FLUZONE) 10/21/2020, 10/28/2021, 10/06/2022   • Hepatitis B vaccine, adult *Check Product/Dose* 06/14/2013, 07/12/2013, 12/13/2013   • Influenza, High Dose Seasonal, Preservative Free 10/14/2016, 10/27/2017, 11/02/2018, 11/14/2019   • Influenza, injectable, quadrivalent 10/09/2015   • Influenza,  seasonal, injectable 10/31/2012, 10/15/2013   • Moderna SARS-CoV-2 Vaccination 03/04/2021, 03/15/2021, 04/01/2021, 04/15/2021, 12/15/2021   • Pneumococcal conjugate vaccine, 13-valent (PREVNAR 13) 10/14/2016   • Pneumococcal polysaccharide vaccine, 23-valent, age 2 years and older (PNEUMOVAX 23) 03/15/2013, 05/04/2018   • Td (adult), unspecified 03/12/2004, 03/01/2012   • Zoster vaccine, recombinant, adult (SHINGRIX) 10/06/2022, 01/11/2023   • Zoster, live 01/01/2009     Patient's medical history was reviewed and updated either before or during this encounter.       Sergio Dumont MD

## 2024-07-30 ENCOUNTER — APPOINTMENT (OUTPATIENT)
Dept: OPHTHALMOLOGY | Facility: CLINIC | Age: 74
End: 2024-07-30
Payer: MEDICARE

## 2024-07-30 ENCOUNTER — OFFICE VISIT (OUTPATIENT)
Dept: OPHTHALMOLOGY | Facility: CLINIC | Age: 74
End: 2024-07-30
Payer: MEDICARE

## 2024-07-30 DIAGNOSIS — H35.033 HYPERTENSIVE RETINOPATHY OF BOTH EYES: Primary | ICD-10-CM

## 2024-07-30 DIAGNOSIS — H25.813 COMBINED FORMS OF AGE-RELATED CATARACT OF BOTH EYES: ICD-10-CM

## 2024-07-30 DIAGNOSIS — H52.7 DISORDER OF REFRACTION: ICD-10-CM

## 2024-07-30 PROCEDURE — 92015 DETERMINE REFRACTIVE STATE: CPT | Performed by: OPHTHALMOLOGY

## 2024-07-30 PROCEDURE — 99214 OFFICE O/P EST MOD 30 MIN: CPT | Performed by: OPHTHALMOLOGY

## 2024-07-30 ASSESSMENT — CUP TO DISC RATIO
OS_RATIO: 0.3
OD_RATIO: 0.35

## 2024-07-30 ASSESSMENT — TONOMETRY
IOP_METHOD: GOLDMANN APPLANATION
OS_IOP_MMHG: 17
OD_IOP_MMHG: 17

## 2024-07-30 ASSESSMENT — REFRACTION_WEARINGRX
OS_AXIS: 074
OD_ADD: +3.00
OS_ADD: +3.00
OD_SPHERE: +2.50
SPECS_TYPE: PAL
OS_SPHERE: +2.75
OD_AXIS: 094
OD_CYLINDER: -1.00
OS_CYLINDER: -0.75

## 2024-07-30 ASSESSMENT — ENCOUNTER SYMPTOMS
CONSTITUTIONAL NEGATIVE: 0
HEMATOLOGIC/LYMPHATIC NEGATIVE: 0
RESPIRATORY NEGATIVE: 0
GASTROINTESTINAL NEGATIVE: 0
CARDIOVASCULAR NEGATIVE: 0
MUSCULOSKELETAL NEGATIVE: 1
NEUROLOGICAL NEGATIVE: 0
ENDOCRINE NEGATIVE: 0
EYES NEGATIVE: 0
ALLERGIC/IMMUNOLOGIC NEGATIVE: 0
PSYCHIATRIC NEGATIVE: 0

## 2024-07-30 ASSESSMENT — REFRACTION_MANIFEST
OD_SPHERE: +3.00
METHOD_AUTOREFRACTION: 1
OD_CYLINDER: -1.25
OS_CYLINDER: -0.75
OD_AXIS: 085
OS_SPHERE: +2.75
OS_AXIS: 075

## 2024-07-30 ASSESSMENT — SLIT LAMP EXAM - LIDS
COMMENTS: NORMAL
COMMENTS: NORMAL

## 2024-07-30 ASSESSMENT — EXTERNAL EXAM - RIGHT EYE: OD_EXAM: NORMAL

## 2024-07-30 ASSESSMENT — KERATOMETRY
OS_AXISANGLE2_DEGREES: 90
OS_K1POWER_DIOPTERS: 45.00
OD_AXISANGLE_DEGREES: 180
OD_AXISANGLE2_DEGREES: 90
OS_AXISANGLE_DEGREES: 180
METHOD_AUTO_MANUAL: AUTOMATED
OD_K2POWER_DIOPTERS: 45.75
OS_K2POWER_DIOPTERS: 46.00
OD_K1POWER_DIOPTERS: 45.00

## 2024-07-30 ASSESSMENT — PAIN SCALES - GENERAL: PAINLEVEL: 0-NO PAIN

## 2024-07-30 ASSESSMENT — VISUAL ACUITY
OS_CC+: +1
CORRECTION_TYPE: GLASSES
OS_CC: 20/25
METHOD: SNELLEN - SINGLE
OD_CC: 20/25

## 2024-07-30 ASSESSMENT — PATIENT HEALTH QUESTIONNAIRE - PHQ9
SUM OF ALL RESPONSES TO PHQ9 QUESTIONS 1 AND 2: 0
1. LITTLE INTEREST OR PLEASURE IN DOING THINGS: NOT AT ALL
2. FEELING DOWN, DEPRESSED OR HOPELESS: NOT AT ALL

## 2024-07-30 ASSESSMENT — EXTERNAL EXAM - LEFT EYE: OS_EXAM: NORMAL

## 2024-07-30 NOTE — PATIENT INSTRUCTIONS
Thank you so much for choosing me to provide your care today!    If you were dilated your vision may remain blurry   or light sensitive for several hours.    The nature of eye and vision problems can require frequent follow up, please make every effort to adhere to any future appointments.    If you have any issues, questions, or concerns,   please do not hesitate to reach out.    If you receive a survey in regards to your care today, please mention any exceptional care my office staff and/or technicians provided.    You can reach our office at this number:  [phone]

## 2024-07-30 NOTE — ASSESSMENT & PLAN NOTE
Small amount heme both eyes (OU) around ONH, suspect HTN related. Advised on continuing best HTN control and should call if any significant change in vision noted both eyes (OU).

## 2024-07-30 NOTE — ASSESSMENT & PLAN NOTE
Borderline significant by appearance but limited symptoms. Advised will continue to monitor with serial exam.

## 2024-07-30 NOTE — PROGRESS NOTES
Assessment/Plan   Problem List Items Addressed This Visit       Combined forms of age-related cataract of both eyes     Borderline significant by appearance but limited symptoms. Advised will continue to monitor with serial exam.          Disorder of refraction     Discussed glasses prescription from refraction. Will provide if patient interested in keeping for records or to fill as a new set of glasses.            Hypertensive retinopathy of both eyes - Primary     Small amount heme both eyes (OU) around ONH, suspect HTN related. Advised on continuing best HTN control and should call if any significant change in vision noted both eyes (OU).             Provided reassurance regarding above diagnoses and care received in the office visit today. Discussed outcomes and options along with the importance of treatment compliance. Understands the importance of any follow up visits. Patient instructed to call/communicate with our office if any new issues, questions, or concerns.     Will plan to see back in 1 year full or sooner PRN

## 2024-08-07 NOTE — PROGRESS NOTES
Subjective   Patient ID: Jose Baker is a 74 y.o. male who presents for No chief complaint on file..  HPI  2 yr peh/ toupet  HT: 69.5  IN  Review of Systems  CONSTITUTIONAL:          Chills No.  Fatigue No.  Fever No.   ADMITS TO JOINT PAIN      CARDIOLOGY:          Negative for dizziness, chest pain, palpitations, shortness of breath.    admits to htn     RESPIRATORY:          Sleep Apnea No.ADMITS TO INSOMNIA  Negative for chest congestion, cough, wheezing.         GASTROENTEROLOGY:          Food Intolerance No.  Abdominal pain No.  Acid reflux No.  Black stools No.  Constipation No.  Diarrhea No.  Loss of appetite no.  Nausea yes  Vomiting No.         UROLOGY:          Negative for dysuria, urinary urgency, kidney stones.         PSYCHOLOGY:          Negative for depression, anxiety, high stress level.    Objective   Physical Exam    Assessment/Plan            Nicci Shea LPN 08/07/24 2:16 PM

## 2024-08-15 ENCOUNTER — OFFICE VISIT (OUTPATIENT)
Dept: SURGERY | Facility: CLINIC | Age: 74
End: 2024-08-15
Payer: MEDICARE

## 2024-08-15 VITALS
HEART RATE: 85 BPM | SYSTOLIC BLOOD PRESSURE: 139 MMHG | BODY MASS INDEX: 26.48 KG/M2 | WEIGHT: 185 LBS | HEIGHT: 70 IN | DIASTOLIC BLOOD PRESSURE: 66 MMHG

## 2024-08-15 DIAGNOSIS — Z87.19 S/P REPAIR OF PARAESOPHAGEAL HERNIA: Primary | ICD-10-CM

## 2024-08-15 DIAGNOSIS — Z98.890 S/P REPAIR OF PARAESOPHAGEAL HERNIA: Primary | ICD-10-CM

## 2024-08-15 PROCEDURE — 1159F MED LIST DOCD IN RCRD: CPT | Performed by: SURGERY

## 2024-08-15 PROCEDURE — 99213 OFFICE O/P EST LOW 20 MIN: CPT | Performed by: SURGERY

## 2024-08-15 PROCEDURE — 1125F AMNT PAIN NOTED PAIN PRSNT: CPT | Performed by: SURGERY

## 2024-08-15 PROCEDURE — 3078F DIAST BP <80 MM HG: CPT | Performed by: SURGERY

## 2024-08-15 PROCEDURE — 3075F SYST BP GE 130 - 139MM HG: CPT | Performed by: SURGERY

## 2024-08-15 ASSESSMENT — LIFESTYLE VARIABLES
HOW OFTEN DO YOU HAVE A DRINK CONTAINING ALCOHOL: 2-4 TIMES A MONTH
SKIP TO QUESTIONS 9-10: 0
HOW OFTEN DO YOU HAVE SIX OR MORE DRINKS ON ONE OCCASION: LESS THAN MONTHLY
HOW MANY STANDARD DRINKS CONTAINING ALCOHOL DO YOU HAVE ON A TYPICAL DAY: 1 OR 2
AUDIT-C TOTAL SCORE: 3

## 2024-08-15 ASSESSMENT — PAIN SCALES - GENERAL: PAINLEVEL: 6

## 2024-08-15 ASSESSMENT — COLUMBIA-SUICIDE SEVERITY RATING SCALE - C-SSRS
1. IN THE PAST MONTH, HAVE YOU WISHED YOU WERE DEAD OR WISHED YOU COULD GO TO SLEEP AND NOT WAKE UP?: NO
6. HAVE YOU EVER DONE ANYTHING, STARTED TO DO ANYTHING, OR PREPARED TO DO ANYTHING TO END YOUR LIFE?: NO
2. HAVE YOU ACTUALLY HAD ANY THOUGHTS OF KILLING YOURSELF?: NO

## 2024-08-15 NOTE — H&P
History Of Present Illness  Jose Baker is a 74 y.o. man here for 2 year follow up from paraesophageal hernia repair.  He denies reflux.  He denies dysphagia.  He has no complaints..     Past Medical History  He has a past medical history of Abnormality of gait (08/09/2017), Anemia (08/22/2022), Arthritis of knee (01/20/2017), Benign enlargement of prostate, Cervical radiculopathy, Chronic pain of left knee (08/09/2017), Chronic rhinitis, Combined forms of age-related cataract of both eyes (07/08/2024), Contact with and (suspected) exposure to covid-19 (08/22/2022), Degenerative arthritis (08/25/2023), Disorder of refraction (07/08/2024), ED (erectile dysfunction), Encounter for screening for COVID-19 (08/19/2022), Epidermoid cyst (07/08/2024), Heart murmur (08/25/2023), History of asbestos exposure, History of hypertension (07/08/2024), History of malignant neoplasm (07/08/2024), History of SCC (squamous cell carcinoma) of skin, HTN (hypertension), Hypokalemia (05/28/2023), Impaired fasting blood sugar, Lumbar radiculopathy, Lumbar spinal stenosis (08/25/2023), Lumbar spondylosis (07/08/2024), Migraine with aura (07/08/2024), Ocular migraine, Osteoarthritis (05/28/2023), Paraesophageal hernia (08/25/2023), Personal history of malignant neoplasm, unspecified, Personal history of other diseases of the circulatory system, Primary osteoarthritis of left knee (01/20/2017), Pure hypercholesterolemia (08/25/2023), Rash and other nonspecific skin eruption (07/08/2024), Spinal stenosis of lumbar region (07/08/2024), and Strain of thoracic back region (10/05/2018).    Surgical History  He has a past surgical history that includes Other surgical history (02/18/2022); Paraesophageal hernia repair (08/22/2022); and Cyst Removal.     Social History  He reports that he quit smoking about 32 years ago. His smoking use included cigarettes. He has never used smokeless tobacco. He reports current alcohol use of about 10.0 standard  "drinks of alcohol per week. He reports that he does not use drugs.    Family History  Family History   Problem Relation Name Age of Onset    Diabetes Father      Deep vein thrombosis Brother      Deep vein thrombosis Paternal Grandfather          Allergies  Ipratropium bromide and Losartan    Review of Systems   CONSTITUTIONAL:          Chills No.  Fatigue No.  Fever No.   ADMITS TO JOINT PAIN      CARDIOLOGY:          Negative for dizziness, chest pain, palpitations, shortness of breath.    admits to htn     RESPIRATORY:          Sleep Apnea No.ADMITS TO INSOMNIA  Negative for chest congestion, cough, wheezing.         GASTROENTEROLOGY:          Food Intolerance No.  Abdominal pain No.  Acid reflux No.  Black stools No.  Constipation No.  Diarrhea No.  Loss of appetite no.  Nausea yes  Vomiting No.         UROLOGY:          Negative for dysuria, urinary urgency, kidney stones.         PSYCHOLOGY:          Negative for depression, anxiety, high stress level.      Last Recorded Vitals  Blood pressure 139/66, pulse 85, height 1.765 m (5' 9.5\"), weight 83.9 kg (185 lb).     Assessment/Plan   Problem List Items Addressed This Visit             ICD-10-CM    S/P repair of paraesophageal hernia - Primary Z98.890, Z87.19       We reviewed the importance of not gaining weight.  Jose will call if he has GERD, dysphagia or recurrent symptoms.       I spent 20 minutes in the professional and overall care of this patient.      Dawson Lobo MD    "

## 2024-08-21 PROBLEM — Z87.19 S/P REPAIR OF PARAESOPHAGEAL HERNIA: Status: ACTIVE | Noted: 2024-08-21

## 2024-08-21 PROBLEM — Z98.890 S/P REPAIR OF PARAESOPHAGEAL HERNIA: Status: ACTIVE | Noted: 2024-08-21

## 2024-12-30 ENCOUNTER — LAB (OUTPATIENT)
Dept: LAB | Facility: LAB | Age: 74
End: 2024-12-30
Payer: MEDICARE

## 2024-12-30 DIAGNOSIS — N40.0 BENIGN PROSTATIC HYPERPLASIA WITHOUT LOWER URINARY TRACT SYMPTOMS: ICD-10-CM

## 2024-12-30 DIAGNOSIS — E78.00 PURE HYPERCHOLESTEROLEMIA: ICD-10-CM

## 2024-12-30 DIAGNOSIS — R73.01 IMPAIRED FASTING GLUCOSE: ICD-10-CM

## 2024-12-30 LAB
ALBUMIN SERPL BCP-MCNC: 4.5 G/DL (ref 3.4–5)
ALP SERPL-CCNC: 86 U/L (ref 33–136)
ALT SERPL W P-5'-P-CCNC: 17 U/L (ref 10–52)
ANION GAP SERPL CALCULATED.3IONS-SCNC: 11 MMOL/L (ref 10–20)
AST SERPL W P-5'-P-CCNC: 16 U/L (ref 9–39)
BASOPHILS # BLD AUTO: 0.1 X10*3/UL (ref 0–0.1)
BASOPHILS NFR BLD AUTO: 1.4 %
BILIRUB SERPL-MCNC: 0.6 MG/DL (ref 0–1.2)
BUN SERPL-MCNC: 20 MG/DL (ref 6–23)
CALCIUM SERPL-MCNC: 8.9 MG/DL (ref 8.6–10.3)
CHLORIDE SERPL-SCNC: 105 MMOL/L (ref 98–107)
CHOLEST SERPL-MCNC: 154 MG/DL (ref 0–199)
CHOLEST/HDLC SERPL: 2.1 {RATIO}
CO2 SERPL-SCNC: 28 MMOL/L (ref 21–32)
CREAT SERPL-MCNC: 0.92 MG/DL (ref 0.5–1.3)
EGFRCR SERPLBLD CKD-EPI 2021: 86 ML/MIN/1.73M*2
EOSINOPHIL # BLD AUTO: 0.37 X10*3/UL (ref 0–0.4)
EOSINOPHIL NFR BLD AUTO: 5.2 %
ERYTHROCYTE [DISTWIDTH] IN BLOOD BY AUTOMATED COUNT: 13.4 % (ref 11.5–14.5)
EST. AVERAGE GLUCOSE BLD GHB EST-MCNC: 114 MG/DL
GLUCOSE SERPL-MCNC: 102 MG/DL (ref 74–99)
HBA1C MFR BLD: 5.6 %
HCT VFR BLD AUTO: 41.4 % (ref 41–52)
HDLC SERPL-MCNC: 71.7 MG/DL
HGB BLD-MCNC: 13.8 G/DL (ref 13.5–17.5)
IMM GRANULOCYTES # BLD AUTO: 0.02 X10*3/UL (ref 0–0.5)
IMM GRANULOCYTES NFR BLD AUTO: 0.3 % (ref 0–0.9)
LDLC SERPL CALC-MCNC: 65 MG/DL
LYMPHOCYTES # BLD AUTO: 1.62 X10*3/UL (ref 0.8–3)
LYMPHOCYTES NFR BLD AUTO: 23 %
MCH RBC QN AUTO: 31.5 PG (ref 26–34)
MCHC RBC AUTO-ENTMCNC: 33.3 G/DL (ref 32–36)
MCV RBC AUTO: 95 FL (ref 80–100)
MONOCYTES # BLD AUTO: 0.8 X10*3/UL (ref 0.05–0.8)
MONOCYTES NFR BLD AUTO: 11.3 %
NEUTROPHILS # BLD AUTO: 4.14 X10*3/UL (ref 1.6–5.5)
NEUTROPHILS NFR BLD AUTO: 58.8 %
NON HDL CHOLESTEROL: 82 MG/DL (ref 0–149)
NRBC BLD-RTO: 0 /100 WBCS (ref 0–0)
PLATELET # BLD AUTO: 202 X10*3/UL (ref 150–450)
POTASSIUM SERPL-SCNC: 4.2 MMOL/L (ref 3.5–5.3)
PROT SERPL-MCNC: 6.5 G/DL (ref 6.4–8.2)
PSA SERPL-MCNC: 0.61 NG/ML
RBC # BLD AUTO: 4.38 X10*6/UL (ref 4.5–5.9)
SODIUM SERPL-SCNC: 140 MMOL/L (ref 136–145)
TRIGL SERPL-MCNC: 89 MG/DL (ref 0–149)
VLDL: 18 MG/DL (ref 0–40)
WBC # BLD AUTO: 7.1 X10*3/UL (ref 4.4–11.3)

## 2024-12-30 PROCEDURE — 85025 COMPLETE CBC W/AUTO DIFF WBC: CPT

## 2024-12-30 PROCEDURE — 80061 LIPID PANEL: CPT

## 2024-12-30 PROCEDURE — 84153 ASSAY OF PSA TOTAL: CPT

## 2024-12-30 PROCEDURE — 83036 HEMOGLOBIN GLYCOSYLATED A1C: CPT

## 2024-12-30 PROCEDURE — 80053 COMPREHEN METABOLIC PANEL: CPT

## 2025-01-30 ENCOUNTER — OFFICE VISIT (OUTPATIENT)
Dept: PRIMARY CARE | Facility: CLINIC | Age: 75
End: 2025-01-30
Payer: MEDICARE

## 2025-01-30 VITALS
BODY MASS INDEX: 26.48 KG/M2 | HEIGHT: 70 IN | HEART RATE: 79 BPM | TEMPERATURE: 97.4 F | OXYGEN SATURATION: 97 % | DIASTOLIC BLOOD PRESSURE: 72 MMHG | WEIGHT: 185 LBS | SYSTOLIC BLOOD PRESSURE: 144 MMHG

## 2025-01-30 DIAGNOSIS — I10 ESSENTIAL HYPERTENSION: ICD-10-CM

## 2025-01-30 DIAGNOSIS — Z77.090 ASBESTOS EXPOSURE: ICD-10-CM

## 2025-01-30 DIAGNOSIS — J30.0 VASOMOTOR RHINITIS: ICD-10-CM

## 2025-01-30 DIAGNOSIS — J30.89 NON-SEASONAL ALLERGIC RHINITIS, UNSPECIFIED TRIGGER: ICD-10-CM

## 2025-01-30 DIAGNOSIS — E78.00 PURE HYPERCHOLESTEROLEMIA: ICD-10-CM

## 2025-01-30 DIAGNOSIS — Z76.89 ENCOUNTER TO ESTABLISH CARE WITH NEW DOCTOR: Primary | ICD-10-CM

## 2025-01-30 PROCEDURE — 3077F SYST BP >= 140 MM HG: CPT | Performed by: INTERNAL MEDICINE

## 2025-01-30 PROCEDURE — 99214 OFFICE O/P EST MOD 30 MIN: CPT | Performed by: INTERNAL MEDICINE

## 2025-01-30 PROCEDURE — 1125F AMNT PAIN NOTED PAIN PRSNT: CPT | Performed by: INTERNAL MEDICINE

## 2025-01-30 PROCEDURE — 3078F DIAST BP <80 MM HG: CPT | Performed by: INTERNAL MEDICINE

## 2025-01-30 PROCEDURE — 1159F MED LIST DOCD IN RCRD: CPT | Performed by: INTERNAL MEDICINE

## 2025-01-30 RX ORDER — AZELASTINE 1 MG/ML
1 SPRAY, METERED NASAL 2 TIMES DAILY
Qty: 30 ML | Refills: 3 | Status: SHIPPED | OUTPATIENT
Start: 2025-01-30 | End: 2026-01-30

## 2025-01-30 RX ORDER — AMLODIPINE BESYLATE 10 MG/1
10 TABLET ORAL
Qty: 90 TABLET | Refills: 2 | Status: SHIPPED | OUTPATIENT
Start: 2025-01-30

## 2025-01-30 RX ORDER — ATORVASTATIN CALCIUM 10 MG/1
10 TABLET, FILM COATED ORAL NIGHTLY
Qty: 90 TABLET | Refills: 2 | Status: SHIPPED | OUTPATIENT
Start: 2025-01-30

## 2025-01-30 RX ORDER — LISINOPRIL 10 MG/1
10 TABLET ORAL DAILY
Qty: 90 TABLET | Refills: 2 | Status: SHIPPED | OUTPATIENT
Start: 2025-01-30 | End: 2025-10-27

## 2025-01-30 ASSESSMENT — PAIN SCALES - GENERAL: PAINLEVEL_OUTOF10: 6

## 2025-01-30 ASSESSMENT — ENCOUNTER SYMPTOMS
LOSS OF SENSATION IN FEET: 0
OCCASIONAL FEELINGS OF UNSTEADINESS: 0
DEPRESSION: 0

## 2025-01-30 ASSESSMENT — PATIENT HEALTH QUESTIONNAIRE - PHQ9
SUM OF ALL RESPONSES TO PHQ9 QUESTIONS 1 AND 2: 0
2. FEELING DOWN, DEPRESSED OR HOPELESS: NOT AT ALL
1. LITTLE INTEREST OR PLEASURE IN DOING THINGS: NOT AT ALL

## 2025-01-30 NOTE — PROGRESS NOTES
[facility]: [facility]  PROGRESS NOTE      Jose Baker is a 74 y.o. male that is presenting today for Establish Care (Eisw pt to est).    Assessment/Plan   {Assess/Plan SmartLinks (Optional):83927}  Subjective   HPI    - Jose Baker 74 y.o. male is here today to establish care (TE) / BW results and refills     The Atrovent nasal spray drying his nose and causing PND laying down        - Patient denies any other symptoms or concerns at this time.       - patient denies any adverse reactions to or concerns with his/her meds.       - Problem list and medication reconciliation done today.  - V.S. Stable. No changes at this time.  - Encouraged continued diet and exercise modification.     Review of Systems   All pertinent POSITIVES as noted per HPI.  All other systems have been reviewed and are NEGATIVE and /or Noncontributory to this patient current visit or complaint.     Objective   Vitals:    01/30/25 1415   BP: 144/72   Pulse: 79   Temp: 36.3 °C (97.4 °F)   SpO2: 97%      Body mass index is 26.93 kg/m².  Physical Exam  Vitals and nursing note reviewed.   Constitutional:       Appearance: Normal appearance.   HENT:      Head: Normocephalic and atraumatic.   Neck:      Vascular: No carotid bruit.   Cardiovascular:      Rate and Rhythm: Normal rate and regular rhythm.      Pulses: Normal pulses.      Heart sounds: Normal heart sounds.   Pulmonary:      Effort: Pulmonary effort is normal.      Breath sounds: Normal breath sounds.   Abdominal:      General: Abdomen is flat. Bowel sounds are normal.      Palpations: Abdomen is soft.   Musculoskeletal:         General: No swelling. Normal range of motion.      Cervical back: Neck supple.   Lymphadenopathy:      Cervical: No cervical adenopathy.   Skin:     General: Skin is warm and dry.   Neurological:      Mental Status: He is alert.   Psychiatric:         Mood and Affect: Mood normal.       Diagnostic Results   Lab Results   Component Value  "Date    GLUCOSE 102 (H) 12/30/2024    CALCIUM 8.9 12/30/2024     12/30/2024    K 4.2 12/30/2024    CO2 28 12/30/2024     12/30/2024    BUN 20 12/30/2024    CREATININE 0.92 12/30/2024     Lab Results   Component Value Date    ALT 17 12/30/2024    AST 16 12/30/2024    ALKPHOS 86 12/30/2024    BILITOT 0.6 12/30/2024     Lab Results   Component Value Date    WBC 7.1 12/30/2024    HGB 13.8 12/30/2024    HCT 41.4 12/30/2024    MCV 95 12/30/2024     12/30/2024     Lab Results   Component Value Date    CHOL 154 12/30/2024    CHOL 155 03/11/2024    CHOL 140 03/08/2023     Lab Results   Component Value Date    HDL 71.7 12/30/2024    HDL 76.0 03/11/2024    HDL 75 03/08/2023     Lab Results   Component Value Date    LDLCALC 65 12/30/2024    LDLCALC 61 (L) 03/11/2024    LDLCALC 45 (L) 03/08/2023     Lab Results   Component Value Date    TRIG 89 12/30/2024    TRIG 88 03/11/2024    TRIG 99 03/08/2023     No components found for: \"CHOLHDL\"  Lab Results   Component Value Date    HGBA1C 5.6 12/30/2024     Other labs not included in the list above were reviewed either before or during this encounter.    History    Past Medical History:   Diagnosis Date    Abnormality of gait 08/09/2017    Anemia 08/22/2022    Arthritis of knee 01/20/2017    Benign enlargement of prostate     10/22 PSA 0.8, 3/24 0.60    Cervical radiculopathy     Chronic pain of left knee 08/09/2017    Chronic rhinitis     nasal steroids no help, seen ENT, atrovent nasal spray    Combined forms of age-related cataract of both eyes 07/08/2024    Contact with and (suspected) exposure to covid-19 08/22/2022    Degenerative arthritis 08/25/2023    Disorder of refraction 07/08/2024    ED (erectile dysfunction)     Encounter for screening for COVID-19 08/19/2022    Epidermoid cyst 07/08/2024    Heart murmur 08/25/2023    Echo Dr. Perkins echo 10/22 EF 60% valves OK.    History of asbestos exposure     [facility]. 3/23 CXR neg.    History of hypertension " 2024    History of malignant neoplasm 2024    History of SCC (squamous cell carcinoma) of skin     [facility] skin check yrly m1    HTN (hypertension)     ECG NSR     Hypokalemia 2023    Impaired fasting blood sugar     Lumbar radiculopathy      injections1=/- response, declines surgery.    Lumbar spinal stenosis 2023    Lumbar spondylosis 2024    Migraine with aura 2024    Ocular migraine     carotids 50-69% B, ESR,  follow up     Osteoarthritis 2023    Paraesophageal hernia 2023    Personal history of malignant neoplasm, unspecified     History of malignant neoplasm    Personal history of other diseases of the circulatory system     History of hypertension    Primary osteoarthritis of left knee 2017    Pure hypercholesterolemia 2023    Rash and other nonspecific skin eruption 2024    Spinal stenosis of lumbar region 2024    Strain of thoracic back region 10/05/2018     Past Surgical History:   Procedure Laterality Date    CYST REMOVAL      left ear sebaceous cyst removal .    OTHER SURGICAL HISTORY  2022    Skin lesion excision    PARAESOPHAGEAL HERNIA REPAIR  2022    LAPAROSCOPIC PARAESOPHAGEAL HERNIA REPAIR WITH MESH REINFORCEMENT OF CRURAL REPAIR, TOUPET PARTIAL FUNDOPLICATION     Family History   Problem Relation Name Age of Onset    Diabetes Father      Deep vein thrombosis Brother      Deep vein thrombosis Paternal Grandfather       Social History     Socioeconomic History    Marital status:      Spouse name: Not on file    Number of children: Not on file    Years of education: Not on file    Highest education level: Not on file   Occupational History    Not on file   Tobacco Use    Smoking status: Former     Current packs/day: 0.00     Types: Cigarettes     Quit date:      Years since quittin.1     Passive exposure: Past    Smokeless tobacco: Never   Vaping Use     Vaping status: Never Used   Substance and Sexual Activity    Alcohol use: Yes     Alcohol/week: 10.0 standard drinks of alcohol     Types: 10 Standard drinks or equivalent per week    Drug use: Never    Sexual activity: Not on file   Other Topics Concern    Not on file   Social History Narrative    Not on file     Social Drivers of Health     Financial Resource Strain: Not on file   Food Insecurity: Not on file   Transportation Needs: Not on file   Physical Activity: Not on file   Stress: Not on file   Social Connections: Not on file   Intimate Partner Violence: Not on file   Housing Stability: Not on file     Allergies   Allergen Reactions    Ipratropium Bromide Other     Dryness/nasal    Losartan Nausea Only and Other     Indigestion/ upset gi     Current Outpatient Medications on File Prior to Visit   Medication Sig Dispense Refill    amLODIPine (Norvasc) 10 mg tablet Take 1 tablet (10 mg) by mouth once daily in the morning. Take before meals. 90 tablet 2    atorvastatin (Lipitor) 10 mg tablet Take 1 tablet (10 mg) by mouth once daily at bedtime. 90 tablet 2    calcium carbonate (Tums) 200 mg calcium chewable tablet Chew 1 tablet (500 mg) once daily.  for 30 day(s)      hydrocortisone 2.5 % cream Apply 1 Application topically once daily as needed (to affected area).      ipratropium (Atrovent) 42 mcg (0.06 %) nasal spray Administer 2 sprays into each nostril 3 times a day as needed (nasal congestion for 30 days). 15 mL 11    lisinopril 10 mg tablet Take 1 tablet (10 mg) by mouth once daily. 90 tablet 2    multivit-minerals/folic acid (ADULT MULTIVITAMIN GUMMIES ORAL) Multivitamin Gummies Adults      polyethylene glycol (Miralax) 17 gram/dose powder Take by mouth once daily as needed (1-2 capfuls).       No current facility-administered medications on file prior to visit.     Immunization History   Administered Date(s) Administered    DTaP vaccine, pediatric  (INFANRIX) 03/16/2012    Flu vaccine, quadrivalent,  high-dose, preservative free, age 65y+ (FLUZONE) 10/21/2020, 10/28/2021, 10/06/2022    Flu vaccine, trivalent, preservative free, HIGH-DOSE, age 65y+ (Fluzone) 10/14/2016, 10/27/2017, 11/02/2018, 11/14/2019    Hepatitis B vaccine, adult *Check Product/Dose* 06/14/2013, 07/12/2013, 12/13/2013    Influenza, injectable, quadrivalent 10/09/2015    Influenza, seasonal, injectable 10/31/2012, 10/15/2013    Influenza, trivalent, adjuvanted 10/21/2024    Moderna SARS-CoV-2 Vaccination 03/04/2021, 03/15/2021, 04/01/2021, 04/15/2021, 12/15/2021    Pneumococcal conjugate vaccine, 13-valent (PREVNAR 13) 10/14/2016    Pneumococcal polysaccharide vaccine, 23-valent, age 2 years and older (PNEUMOVAX 23) 03/15/2013, 05/04/2018    RESPIRATORY SYNCYTIAL VIRUS (RSV), ELIGIBLE PREGNANT PTS, 0.5 ML (ABRYSVO) 10/21/2024    Td (adult), unspecified 03/12/2004, 03/01/2012    Zoster vaccine, recombinant, adult (SHINGRIX) 10/06/2022, 01/11/2023    Zoster, live 01/01/2009     Patient's medical history was reviewed and updated either before or during this encounter.       Tracee Yepez MD   PREGNANT PTS, 0.5 ML (ABRYSVO) 10/21/2024    Td (adult), unspecified 03/12/2004, 03/01/2012    Zoster vaccine, recombinant, adult (SHINGRIX) 10/06/2022, 01/11/2023    Zoster, live 01/01/2009     Patient's medical history was reviewed and updated either before or during this encounter.       Tracee Yepez MD

## 2025-04-09 ENCOUNTER — HOSPITAL ENCOUNTER (OUTPATIENT)
Dept: RADIOLOGY | Facility: CLINIC | Age: 75
Discharge: HOME | End: 2025-04-09
Payer: MEDICARE

## 2025-04-09 DIAGNOSIS — Z77.090 ASBESTOS EXPOSURE: ICD-10-CM

## 2025-04-09 PROCEDURE — 71046 X-RAY EXAM CHEST 2 VIEWS: CPT

## 2025-04-09 PROCEDURE — 71046 X-RAY EXAM CHEST 2 VIEWS: CPT | Performed by: RADIOLOGY

## 2025-07-28 ENCOUNTER — TELEPHONE (OUTPATIENT)
Dept: PRIMARY CARE | Facility: CLINIC | Age: 75
End: 2025-07-28
Payer: MEDICARE

## 2025-08-01 ENCOUNTER — APPOINTMENT (OUTPATIENT)
Dept: PRIMARY CARE | Facility: CLINIC | Age: 75
End: 2025-08-01
Payer: MEDICARE

## 2025-08-04 ENCOUNTER — OFFICE VISIT (OUTPATIENT)
Dept: PRIMARY CARE | Facility: CLINIC | Age: 75
End: 2025-08-04
Payer: MEDICARE

## 2025-08-04 ASSESSMENT — PATIENT HEALTH QUESTIONNAIRE - PHQ9
1. LITTLE INTEREST OR PLEASURE IN DOING THINGS: NOT AT ALL
SUM OF ALL RESPONSES TO PHQ9 QUESTIONS 1 AND 2: 0
2. FEELING DOWN, DEPRESSED OR HOPELESS: NOT AT ALL

## 2025-08-04 ASSESSMENT — PAIN SCALES - GENERAL: PAINLEVEL_OUTOF10: 5

## 2025-08-04 NOTE — PROGRESS NOTES
[facility]: [facility]  MEDICARE WELLNESS EXAM      Jose Baker is a 75 y.o. male that is presenting today for Annual Exam.    Assessment/Plan    {Assess/Plan SmartLinks (Optional):83431}  ADVANCED CARE PLANNING  Advanced Care Planning was discussed with patient:  The patient does not have an advanced care plan on file. The patient does not have an active surrogate decision-maker on file.  Encouraged the patient to confirm that Living Will and Healthcare Power of  (HCPoA) are accurate and up to date.  Encouraged the patient to confirm that our office be provided a copy of any documentation in the event that anything changes.    ACTIVITIES OF DAILY LIVING  Basic ADLs:  Bathing: Independent, Dressing: Independent, Toileting: Independent, Transferring: Independent, Continence: Independent, Feeding: Independent.    Instrumental ADLs:  Ability to use phone: Independent, Shopping: Independent, Cooking: Independent, House-keeping: Independent, Laundry: Independent, Transportation: Independent, Medication Management: Independent, Finance Management: Independent.    Subjective   HPI    - Jose Baker 75 y.o. male is here today for his AWV / results and refills       - Patient denies any symptoms or concerns at this time.       - patient denies any adverse reactions to or concerns with his/her meds.       - Problem list and medication reconciliation done today.  - V.S. Stable. No changes at this time.  - Encouraged continued diet and exercise modification.     Review of Systems  All pertinent POSITIVES as noted per HPI.  All other systems have been reviewed and are NEGATIVE and /or Noncontributory to this patient current visit or complaint.    Objective   Vitals:    08/04/25 1024   BP: 146/71   Pulse: 72   Temp: 36.6 °C (97.9 °F)   SpO2: 97%      Body mass index is 26.35 kg/m².  Physical Exam  Vitals and nursing note reviewed.   Constitutional:       Appearance: Normal appearance.   HENT:       Head: Normocephalic and atraumatic.      Right Ear: Tympanic membrane, ear canal and external ear normal.      Left Ear: Tympanic membrane, ear canal and external ear normal.      Nose: Nose normal.      Mouth/Throat:      Mouth: Mucous membranes are moist.      Pharynx: Oropharynx is clear.     Eyes:      Extraocular Movements: Extraocular movements intact.      Conjunctiva/sclera: Conjunctivae normal.      Pupils: Pupils are equal, round, and reactive to light.     Neck:      Vascular: No carotid bruit.     Cardiovascular:      Rate and Rhythm: Normal rate and regular rhythm.      Pulses: Normal pulses.      Heart sounds: Normal heart sounds.   Pulmonary:      Effort: Pulmonary effort is normal.      Breath sounds: Normal breath sounds.   Abdominal:      General: Abdomen is flat. Bowel sounds are normal.      Palpations: Abdomen is soft.     Musculoskeletal:         General: No swelling. Normal range of motion.      Cervical back: Normal range of motion and neck supple.   Lymphadenopathy:      Cervical: No cervical adenopathy.     Skin:     General: Skin is warm and dry.     Neurological:      General: No focal deficit present.      Mental Status: He is alert and oriented to person, place, and time. Mental status is at baseline.     Psychiatric:         Mood and Affect: Mood normal.         Behavior: Behavior normal.       Diagnostic Results   Lab Results   Component Value Date    GLUCOSE 102 (H) 12/30/2024    CALCIUM 8.9 12/30/2024     12/30/2024    K 4.2 12/30/2024    CO2 28 12/30/2024     12/30/2024    BUN 20 12/30/2024    CREATININE 0.92 12/30/2024     Lab Results   Component Value Date    ALT 17 12/30/2024    AST 16 12/30/2024    ALKPHOS 86 12/30/2024    BILITOT 0.6 12/30/2024     Lab Results   Component Value Date    WBC 7.1 12/30/2024    HGB 13.8 12/30/2024    HCT 41.4 12/30/2024    MCV 95 12/30/2024     12/30/2024     Lab Results   Component Value Date    CHOL 154 12/30/2024    CHOL  "155 2024    CHOL 140 2023     Lab Results   Component Value Date    HDL 71.7 2024    HDL 76.0 2024    HDL 75 2023     Lab Results   Component Value Date    LDLCALC 65 2024    LDLCALC 61 (L) 2024    LDLCALC 45 (L) 2023     Lab Results   Component Value Date    TRIG 89 2024    TRIG 88 2024    TRIG 99 2023     No components found for: \"CHOLHDL\"  Lab Results   Component Value Date    HGBA1C 5.6 2024     Other labs not included in the list above reviewed either before or during this encounter.    History   Medical History[1]  Surgical History[2]  Family History[3]  Social History     Socioeconomic History    Marital status:      Spouse name: Not on file    Number of children: Not on file    Years of education: Not on file    Highest education level: Not on file   Occupational History    Not on file   Tobacco Use    Smoking status: Former     Current packs/day: 0.00     Types: Cigarettes     Quit date:      Years since quittin.6     Passive exposure: Past    Smokeless tobacco: Never   Vaping Use    Vaping status: Never Used   Substance and Sexual Activity    Alcohol use: Yes     Alcohol/week: 22.0 standard drinks of alcohol     Types: 10 Standard drinks or equivalent, 12 Cans of beer per week    Drug use: Never    Sexual activity: Yes     Partners: Female   Other Topics Concern    Not on file   Social History Narrative    Not on file     Social Drivers of Health     Financial Resource Strain: Not on file   Food Insecurity: Not on file   Transportation Needs: Not on file   Physical Activity: Not on file   Stress: Not on file   Social Connections: Not on file   Intimate Partner Violence: Not on file   Housing Stability: Not on file     Allergies[4]  Medications Ordered Prior to Encounter[5]  Immunization History   Administered Date(s) Administered    DTaP vaccine, pediatric  (INFANRIX) 2012    Flu vaccine, quadrivalent, high-dose, " preservative free, age 65y+ (FLUZONE) 10/21/2020, 10/28/2021, 10/06/2022    Flu vaccine, trivalent, preservative free, HIGH-DOSE, age 65y+ (Fluzone) 10/14/2016, 10/27/2017, 11/02/2018, 11/14/2019    Hepatitis B vaccine, adult *Check Product/Dose* 06/14/2013, 07/12/2013, 12/13/2013    Influenza, injectable, quadrivalent 10/09/2015    Influenza, seasonal, injectable 10/31/2012, 10/15/2013    Influenza, trivalent, adjuvanted 10/21/2024    Moderna SARS-CoV-2 Vaccination 03/04/2021, 03/15/2021, 04/01/2021, 04/15/2021, 12/15/2021    Pneumococcal conjugate vaccine, 13-valent (PREVNAR 13) 10/14/2016    Pneumococcal polysaccharide vaccine, 23-valent, age 2 years and older (PNEUMOVAX 23) 03/15/2013, 05/04/2018    RESPIRATORY SYNCYTIAL VIRUS (RSV), ELIGIBLE PREGNANT PTS, 0.5 ML (ABRYSVO) 10/21/2024    Td (adult), unspecified 03/12/2004, 03/01/2012    Zoster vaccine, recombinant, adult (SHINGRIX) 10/06/2022, 01/11/2023    Zoster, live 01/01/2009     Patient's medical history was reviewed and updated either before or during this encounter.     Tracee Yepez MD       [1]   Past Medical History:  Diagnosis Date    Abnormality of gait 08/09/2017    Anemia 08/22/2022    Arthritis 1/1/2000    Arthritis of knee 01/20/2017    Benign enlargement of prostate     10/22 PSA 0.8, 3/24 0.60    Cervical radiculopathy     Chronic pain of left knee 08/09/2017    Chronic rhinitis     nasal steroids no help, seen ENT, atrovent nasal spray    Combined forms of age-related cataract of both eyes 07/08/2024    Contact with and (suspected) exposure to covid-19 08/22/2022    Degenerative arthritis 08/25/2023    Disorder of refraction 07/08/2024    Eczema 1/1/2000    ED (erectile dysfunction)     Encounter for screening for COVID-19 08/19/2022    Epidermoid cyst 07/08/2024    GERD (gastroesophageal reflux disease) 1/1/1965    Heart murmur 08/25/2023    Echo Dr. Perkins echo 10/22 EF 60% valves OK.    History of asbestos exposure     [facility]. 3/23 CXR  neg.    History of hypertension 07/08/2024    History of malignant neoplasm 07/08/2024    History of SCC (squamous cell carcinoma) of skin     [facility] skin check yrly m10/22    HTN (hypertension)     ECG NSR 8/22    Hypokalemia 05/28/2023    Impaired fasting blood sugar     Lumbar radiculopathy      injections1=/- response, declines surgery.    Lumbar spinal stenosis 08/25/2023    Lumbar spondylosis 07/08/2024    Migraine with aura 07/08/2024    Ocular migraine     carotids 50-69% B, ESR,  follow up 7/22    Osteoarthritis 05/28/2023    Paraesophageal hernia 08/25/2023    Personal history of malignant neoplasm, unspecified     History of malignant neoplasm    Personal history of other diseases of the circulatory system     History of hypertension    Primary osteoarthritis of left knee 01/20/2017    Pure hypercholesterolemia 08/25/2023    Rash and other nonspecific skin eruption 07/08/2024    Spinal stenosis of lumbar region 07/08/2024    Strain of thoracic back region 10/05/2018   [2]   Past Surgical History:  Procedure Laterality Date    CYST REMOVAL      left ear sebaceous cyst removal 2/22.    OTHER SURGICAL HISTORY  02/18/2022    Skin lesion excision    PARAESOPHAGEAL HERNIA REPAIR  08/22/2022    LAPAROSCOPIC PARAESOPHAGEAL HERNIA REPAIR WITH MESH REINFORCEMENT OF CRURAL REPAIR, TOUPET PARTIAL FUNDOPLICATION   [3]   Family History  Problem Relation Name Age of Onset    Diabetes Father      Deep vein thrombosis Brother      Deep vein thrombosis Paternal Grandfather     [4]   Allergies  Allergen Reactions    Ipratropium Bromide Other     Dryness/nasal    Losartan Nausea Only and Other     Indigestion/ upset gi   [5]   Current Outpatient Medications on File Prior to Visit   Medication Sig Dispense Refill    amLODIPine (Norvasc) 10 mg tablet Take 1 tablet (10 mg) by mouth once daily in the morning. Take before meals. 90 tablet 2    atorvastatin (Lipitor) 10 mg tablet Take 1 tablet (10  mg) by mouth once daily at bedtime. 90 tablet 2    calcium carbonate (Tums) 200 mg calcium chewable tablet Chew and swallow 1 tablet once daily.  for 30 day(s)      fluoride, sodium, (Prevident 5000 Booster) 1.1 % dental paste BRUSH WITH PASTE 2 TIMES A DAY. SPIT  DO NOT RINSE      hydrocortisone 2.5 % cream Apply 1 Application topically once daily as needed (to affected area).      ipratropium (Atrovent) 42 mcg (0.06 %) nasal spray Administer 2 sprays into each nostril 3 times a day as needed (nasal congestion for 30 days). 15 mL 11    lisinopril 10 mg tablet Take 1 tablet (10 mg) by mouth once daily. 90 tablet 2    multivit-minerals/folic acid (ADULT MULTIVITAMIN GUMMIES ORAL) Multivitamin Gummies Adults      polyethylene glycol (Miralax) 17 gram/dose powder Take by mouth once daily as needed (1-2 capfuls).      azelastine (Astelin) 137 mcg (0.1 %) nasal spray Administer 1 spray into each nostril 2 times a day. Use in each nostril as directed 30 mL 3     No current facility-administered medications on file prior to visit.      by mouth once daily. 90 tablet 2    multivit-minerals/folic acid (ADULT MULTIVITAMIN GUMMIES ORAL) Multivitamin Gummies Adults      polyethylene glycol (Miralax) 17 gram/dose powder Take by mouth once daily as needed (1-2 capfuls).      azelastine (Astelin) 137 mcg (0.1 %) nasal spray Administer 1 spray into each nostril 2 times a day. Use in each nostril as directed 30 mL 3     No current facility-administered medications on file prior to visit.

## 2025-08-05 ENCOUNTER — APPOINTMENT (OUTPATIENT)
Dept: OPHTHALMOLOGY | Facility: CLINIC | Age: 75
End: 2025-08-05
Payer: MEDICARE

## 2025-08-05 DIAGNOSIS — H35.033 HYPERTENSIVE RETINOPATHY OF BOTH EYES: ICD-10-CM

## 2025-08-05 DIAGNOSIS — H25.813 COMBINED FORMS OF AGE-RELATED CATARACT OF BOTH EYES: Primary | ICD-10-CM

## 2025-08-05 DIAGNOSIS — H52.7 DISORDER OF REFRACTION: ICD-10-CM

## 2025-08-05 PROCEDURE — 92015 DETERMINE REFRACTIVE STATE: CPT | Performed by: OPHTHALMOLOGY

## 2025-08-05 PROCEDURE — 99214 OFFICE O/P EST MOD 30 MIN: CPT | Performed by: OPHTHALMOLOGY

## 2025-08-05 ASSESSMENT — REFRACTION_MANIFEST
OS_ADD: +3.00
OS_AXIS: 080
OD_ADD: +3.00
OD_CYLINDER: -1.00
OD_SPHERE: +2.50
OS_AXIS: 080
OD_AXIS: 090
OD_SPHERE: +3.25
METHOD_AUTOREFRACTION: 1
OD_CYLINDER: -1.25
OS_CYLINDER: -1.00
OS_SPHERE: +3.00
OS_SPHERE: +3.00
OD_AXIS: 090
OS_CYLINDER: -1.00

## 2025-08-05 ASSESSMENT — KERATOMETRY
METHOD_AUTO_MANUAL: AUTOMATED
OD_AXISANGLE_DEGREES: 180
OS_AXISANGLE_DEGREES: 10
OS_K1POWER_DIOPTERS: 44.75
OD_K1POWER_DIOPTERS: 45.00
OD_K2POWER_DIOPTERS: 46.00
OS_K2POWER_DIOPTERS: 46.00
OD_AXISANGLE2_DEGREES: 90
OS_AXISANGLE2_DEGREES: 100

## 2025-08-05 ASSESSMENT — VISUAL ACUITY
OS_CC+: -1
OS_CC: 20/25
METHOD: SNELLEN - SINGLE
OD_CC: 20/25
CORRECTION_TYPE: GLASSES

## 2025-08-05 ASSESSMENT — ENCOUNTER SYMPTOMS
ALLERGIC/IMMUNOLOGIC NEGATIVE: 0
GASTROINTESTINAL NEGATIVE: 0
MUSCULOSKELETAL NEGATIVE: 0
NEUROLOGICAL NEGATIVE: 0
ENDOCRINE NEGATIVE: 0
EYES NEGATIVE: 0
RESPIRATORY NEGATIVE: 0
PSYCHIATRIC NEGATIVE: 0
CARDIOVASCULAR NEGATIVE: 0
CONSTITUTIONAL NEGATIVE: 0
HEMATOLOGIC/LYMPHATIC NEGATIVE: 0

## 2025-08-05 ASSESSMENT — EXTERNAL EXAM - RIGHT EYE: OD_EXAM: NORMAL

## 2025-08-05 ASSESSMENT — REFRACTION_WEARINGRX
OD_CYLINDER: -1.00
OS_AXIS: 072
OD_AXIS: 091
OS_ADD: +3.00
OD_SPHERE: +2.50
OS_SPHERE: +2.75
OS_CYLINDER: -1.00
OD_ADD: +3.00

## 2025-08-05 ASSESSMENT — CUP TO DISC RATIO
OD_RATIO: 0.35
OS_RATIO: 0.3

## 2025-08-05 ASSESSMENT — TONOMETRY
OS_IOP_MMHG: 16
OD_IOP_MMHG: 16
IOP_METHOD: GOLDMANN APPLANATION

## 2025-08-05 ASSESSMENT — SLIT LAMP EXAM - LIDS
COMMENTS: NORMAL
COMMENTS: NORMAL

## 2025-08-05 ASSESSMENT — PAIN SCALES - GENERAL: PAINLEVEL_OUTOF10: 0-NO PAIN

## 2025-08-05 ASSESSMENT — EXTERNAL EXAM - LEFT EYE: OS_EXAM: NORMAL

## 2025-08-05 NOTE — ASSESSMENT & PLAN NOTE
Non significant cataract noted by vision and complaints, appear more significant on clinical exam. Discussed the natural course of cataract and may require surgery at some point in the future. Will plan to continue to monitor with serial exam.

## 2025-08-05 NOTE — PATIENT INSTRUCTIONS
Thank you so much for choosing me to provide your care today!    If you were dilated your vision may remain blurry   or light sensitive for several hours.    The nature of eye and vision problems can require frequent follow up, please make every effort to adhere to any future appointments.    If you have any issues, questions, or concerns,   please do not hesitate to reach out.    If you receive a survey in regards to your care today, please mention any exceptional care my office staff and/or technicians provided.    You can reach our office at this number:    [phone]    Please consider signing up for and utilizing [employer]!  This is the best way to directly reach me or other  providers

## 2025-08-05 NOTE — PROGRESS NOTES
Assessment/Plan   Problem List Items Addressed This Visit       Combined forms of age-related cataract of both eyes - Primary    Non significant cataract noted by vision and complaints, appear more significant on clinical exam. Discussed the natural course of cataract and may require surgery at some point in the future. Will plan to continue to monitor with serial exam.            Disorder of refraction    New Rx for glasses/SCL given per patient request. Patient's signature obtained to acknowledge and confirm that a paper copy of glasses/SCL Rx was given to patient in compliance with [facility] Eyeglass Rule. Electronic copy of Rx will also be available via [email or web address].            Hypertensive retinopathy of both eyes    Prior flame heme resolved, only mild vascular attenuation of arterial system. Advised to continue best BP control and plan to monitor with serial exam.             Provided reassurance regarding above diagnoses and care received in the office visit today. Discussed outcomes and options along with the importance of treatment compliance. Understands the importance of any follow up visits. Patient instructed to call/communicate with our office if any new issues, questions, or concerns.     Will plan to see back in 1 year full or sooner PRN

## 2025-08-05 NOTE — ASSESSMENT & PLAN NOTE
Prior flame heme resolved, only mild vascular attenuation of arterial system. Advised to continue best BP control and plan to monitor with serial exam.    Ketoconazole Counseling:   Patient counseled regarding improving absorption with orange juice.  Adverse effects include but are not limited to breast enlargement, headache, diarrhea, nausea, upset stomach, liver function test abnormalities, taste disturbance, and stomach pain.  There is a rare possibility of liver failure that can occur when taking ketoconazole. The patient understands that monitoring of LFTs may be required, especially at baseline. The patient verbalized understanding of the proper use and possible adverse effects of ketoconazole.  All of the patient's questions and concerns were addressed.

## 2025-08-05 NOTE — ASSESSMENT & PLAN NOTE
New Rx for glasses/SCL given per patient request. Patient's signature obtained to acknowledge and confirm that a paper copy of glasses/SCL Rx was given to patient in compliance with [facility] Eyeglass Rule. Electronic copy of Rx will also be available via [employer][email or web address].

## 2026-02-02 ENCOUNTER — APPOINTMENT (OUTPATIENT)
Dept: PRIMARY CARE | Facility: CLINIC | Age: 76
End: 2026-02-02
Payer: MEDICARE

## 2026-08-13 ENCOUNTER — APPOINTMENT (OUTPATIENT)
Dept: OPHTHALMOLOGY | Facility: CLINIC | Age: 76
End: 2026-08-13
Payer: MEDICARE